# Patient Record
Sex: MALE | Race: OTHER | HISPANIC OR LATINO | Employment: OTHER | URBAN - NONMETROPOLITAN AREA
[De-identification: names, ages, dates, MRNs, and addresses within clinical notes are randomized per-mention and may not be internally consistent; named-entity substitution may affect disease eponyms.]

---

## 2022-12-02 ENCOUNTER — HOSPITAL ENCOUNTER (EMERGENCY)
Facility: HOSPITAL | Age: 57
Discharge: HOME | End: 2022-12-02
Attending: EMERGENCY MEDICINE

## 2022-12-02 VITALS
RESPIRATION RATE: 16 BRPM | TEMPERATURE: 97.8 F | SYSTOLIC BLOOD PRESSURE: 148 MMHG | HEART RATE: 60 BPM | DIASTOLIC BLOOD PRESSURE: 93 MMHG | OXYGEN SATURATION: 98 %

## 2022-12-02 DIAGNOSIS — Z87.19 HISTORY OF DIVERTICULOSIS: ICD-10-CM

## 2022-12-02 DIAGNOSIS — I10 HYPERTENSION: ICD-10-CM

## 2022-12-02 DIAGNOSIS — Z76.0 MEDICATION REFILL: Primary | ICD-10-CM

## 2022-12-02 RX ORDER — LISINOPRIL 10 MG/1
10 TABLET ORAL DAILY
Qty: 30 TABLET | Refills: 0 | Status: SHIPPED | OUTPATIENT
Start: 2022-12-02 | End: 2023-01-01

## 2022-12-02 NOTE — ED PROVIDER NOTES
History  Chief Complaint   Patient presents with   • Medication Refill     Patient states he took Lisinopril this morning and ran out of his prescription  Patient c/o headache      68-year-old male presents for evaluation of hypertension  Patient states that he does need medication refill as he recently moved to the area from [place]  Patient took his last lisinopril 10 mg this morning  Patient voices no complaints at this time  No headache, neck pain, chest pain, shortness of breath, nausea or vomiting, dizziness or diaphoresis  No focal neurological deficits  No recent illness  On exam, patient is very well appearing, no distress  None       No past medical history on file  No past surgical history on file  No family history on file  I have reviewed and agree with the history as documented  E-Cigarette/Vaping     E-Cigarette/Vaping Substances     Social History     Tobacco Use   • Smoking status: Never   • Smokeless tobacco: Never   Substance Use Topics   • Alcohol use: Yes     Comment: socially   • Drug use: Never       Review of Systems   Constitutional: Negative for activity change, appetite change, chills, diaphoresis, fatigue and fever  HENT: Negative for dental problem, ear pain, sore throat, trouble swallowing and voice change  Eyes: Negative for pain and visual disturbance  Respiratory: Negative for cough, chest tightness, shortness of breath and wheezing  Cardiovascular: Negative for chest pain, palpitations and leg swelling  Gastrointestinal: Negative for abdominal pain, anal bleeding, blood in stool, diarrhea, nausea, rectal pain and vomiting  Endocrine: Negative for polydipsia, polyphagia and polyuria  Genitourinary: Negative for difficulty urinating, dysuria, flank pain, frequency, hematuria and urgency  Musculoskeletal: Negative for back pain, joint swelling, myalgias, neck pain and neck stiffness  Skin: Negative for pallor, rash and wound  Neurological: Negative for dizziness, facial asymmetry, speech difficulty, weakness, light-headedness, numbness and headaches  Hematological: Negative for adenopathy  Psychiatric/Behavioral: Negative for agitation  The patient is not nervous/anxious  All other systems reviewed and are negative  Physical Exam  Physical Exam  Vitals and nursing note reviewed  Constitutional:       General: He is not in acute distress  Appearance: He is well-developed  He is not ill-appearing, toxic-appearing or diaphoretic  HENT:      Head: Normocephalic and atraumatic  Right Ear: External ear normal       Left Ear: External ear normal       Nose: Nose normal  No congestion or rhinorrhea  Mouth/Throat:      Mouth: Mucous membranes are moist       Pharynx: No oropharyngeal exudate or posterior oropharyngeal erythema  Eyes:      General: No visual field deficit or scleral icterus  Right eye: No discharge  Left eye: No discharge  Extraocular Movements: Extraocular movements intact  Conjunctiva/sclera: Conjunctivae normal       Pupils: Pupils are equal, round, and reactive to light  Neck:      Thyroid: No thyromegaly  Vascular: No JVD  Trachea: No tracheal deviation  Cardiovascular:      Rate and Rhythm: Normal rate and regular rhythm  Pulses: Normal pulses  Heart sounds: Normal heart sounds, S1 normal and S2 normal  No murmur heard  No friction rub  No gallop  Pulmonary:      Effort: Pulmonary effort is normal  No accessory muscle usage, respiratory distress or retractions  Breath sounds: Normal breath sounds  No stridor or decreased air movement  No decreased breath sounds, wheezing, rhonchi or rales  Chest:      Chest wall: No tenderness  Abdominal:      General: There is no distension  Palpations: Abdomen is soft  There is no mass  Tenderness: There is no abdominal tenderness  There is no guarding or rebound        Hernia: No hernia is present  Musculoskeletal:         General: No tenderness or deformity  Normal range of motion  Cervical back: Normal range of motion and neck supple  No rigidity  Lymphadenopathy:      Cervical: No cervical adenopathy  Skin:     General: Skin is warm and dry  Capillary Refill: Capillary refill takes less than 2 seconds  Coloration: Skin is not pale  Findings: No erythema or rash  Neurological:      General: No focal deficit present  Mental Status: He is alert and oriented to person, place, and time  GCS: GCS eye subscore is 4  GCS verbal subscore is 5  GCS motor subscore is 6  Cranial Nerves: Cranial nerves 2-12 are intact  No cranial nerve deficit, dysarthria or facial asymmetry  Sensory: Sensation is intact  No sensory deficit  Motor: Motor function is intact  No weakness or tremor  Coordination: Coordination is intact  Gait: Gait is intact  Deep Tendon Reflexes: Reflexes are normal and symmetric   Reflexes normal    Psychiatric:         Behavior: Behavior normal          Vital Signs  ED Triage Vitals [12/02/22 1535]   Temperature Pulse Respirations Blood Pressure SpO2   97 8 °F (36 6 °C) 79 16 141/86 97 %      Temp Source Heart Rate Source Patient Position - Orthostatic VS BP Location FiO2 (%)   Temporal Monitor Lying -- --      Pain Score       --           Vitals:    12/02/22 1535   BP: 141/86   Pulse: 79   Patient Position - Orthostatic VS: Lying         Visual Acuity      ED Medications  Medications - No data to display    Diagnostic Studies  Results Reviewed     None                 No orders to display              Procedures  Procedures         ED Course                                             MDM  Number of Diagnoses or Management Options  History of diverticulosis  Hypertension  Medication refill  Diagnosis management comments: 50-year-old male past medical history of hypertension presents for medication refill of his lisinopril 10 mg  Patient asymptomatic at this time  Recently moved to the area  Will provide prescription refill, ambulatory referral for family practice as well as Gastroenterology as patient does have a history of diverticulosis  Disposition  Final diagnoses:   Medication refill   Hypertension   History of diverticulosis     Time reflects when diagnosis was documented in both MDM as applicable and the Disposition within this note     Time User Action Codes Description Comment    12/2/2022  4:19 PM Kina Tremayne Add [Z76 0] Medication refill     12/2/2022  4:19 PM Kina Tremayne Add [I10] Hypertension     12/2/2022  4:20 PM Kina Tremayne Add [Z87 19] History of diverticulosis       ED Disposition     ED Disposition   Discharge    Condition   Stable    Date/Time   Fri Dec 2, 2022  4:19 PM    Comment   Emilee Mandujano discharge to home/self care                 Follow-up Information    None         Patient's Medications   Discharge Prescriptions    LISINOPRIL (ZESTRIL) 10 MG TABLET    Take 1 tablet (10 mg total) by mouth daily       Start Date: 12/2/2022 End Date: 1/1/2023       Order Dose: 10 mg       Quantity: 30 tablet    Refills: 0           PDMP Review     None          ED Provider  Electronically Signed by           Gogo Stewart DO  12/02/22 8715

## 2022-12-14 RX ORDER — ALBUTEROL SULFATE 90 UG/1
AEROSOL, METERED RESPIRATORY (INHALATION)
COMMUNITY
Start: 2022-09-08

## 2022-12-14 RX ORDER — FERROUS SULFATE 324(65)MG
324 TABLET, DELAYED RELEASE (ENTERIC COATED) ORAL
COMMUNITY
Start: 2022-09-09

## 2022-12-14 RX ORDER — PANTOPRAZOLE SODIUM 40 MG/1
40 TABLET, DELAYED RELEASE ORAL
COMMUNITY
Start: 2022-09-08

## 2022-12-14 RX ORDER — POLYETHYLENE GLYCOL 3350 17 G/17G
POWDER, FOR SOLUTION ORAL
COMMUNITY
Start: 2022-09-08

## 2022-12-14 RX ORDER — FINASTERIDE 5 MG/1
5 TABLET, FILM COATED ORAL
COMMUNITY
Start: 2022-09-08

## 2022-12-15 ENCOUNTER — OFFICE VISIT (OUTPATIENT)
Dept: GASTROENTEROLOGY | Facility: CLINIC | Age: 57
End: 2022-12-15

## 2022-12-15 VITALS
TEMPERATURE: 98.4 F | RESPIRATION RATE: 96 BRPM | HEIGHT: 67 IN | HEART RATE: 105 BPM | SYSTOLIC BLOOD PRESSURE: 169 MMHG | WEIGHT: 193.6 LBS | BODY MASS INDEX: 30.39 KG/M2 | OXYGEN SATURATION: 96 % | DIASTOLIC BLOOD PRESSURE: 102 MMHG

## 2022-12-15 DIAGNOSIS — Z87.19 HISTORY OF DIVERTICULOSIS: ICD-10-CM

## 2022-12-15 NOTE — PROGRESS NOTES
[facility] - Outpatient Consultation  Josr Linares 61 y o  male MRN: [id number]  Encounter: [id number]          ASSESSMENT AND PLAN:      1  History of diverticulosis    Patient presents today to establish care  He states that he has a history of diverticulosis with 5 episodes of diverticulitis since the year 2020  He also reports several colonoscopies with his last one in either June of this year were November 2021  He denies any perforations or abscesses in the past   Unable to find any results in chart review  Patient presents with information regarding past care however unable to upload information  We will attempt to collect past records  Given patient has no symptoms at this time and reports recent colonoscopy, will hold off on any further evaluation until able to review charts  Did discuss the possibility of surgical evaluation due to frequent episodes of diverticulitis however patient believes he may have already been evaluated and denied  Again will attempt to obtain records  - Ambulatory Referral to Gastroenterology    We will see patient back in 6 months or sooner if needed  ______________________________________________________________________    HPI:   Josr Linares is a 62-year-old male with a past medical history of GERD, diverticulosis and hypertension that presents today to establish care  He reports that he has been seen by the [facility] for many years but is transferring care to the area currently  He reports that he has had at least 5 episodes of diverticulitis since the year 2020  He also reports at least 8 colonoscopies in his lifetime  He is unsure if his last colonoscopy was in November 2021 or June of this year  He states that his last bout of diverticulitis was in June  He denies any perforations or abscesses during these episodes  He does also report a history of rectal bleeding in which he was found to have hemorrhoids    Patient reports that he recently had blood work drawn and his hemoglobin was 12 6  He states that he is feeling well and denies any current bleeding, bloody stools or melena  He denies any abdominal pain and states he has regular bowel movements  He does have a history of GERD and takes pantoprazole 40 mg  He denies any acid reflux, nausea, vomiting or dysphagia  He does report an EGD in the past         REVIEW OF SYSTEMS:    Review of Systems   Constitutional: Negative for unexpected weight change  HENT: Negative for trouble swallowing  Gastrointestinal: Negative for abdominal distention, abdominal pain, anal bleeding, blood in stool, constipation, diarrhea, nausea and vomiting  Historical Information   History reviewed  No pertinent past medical history  History reviewed  No pertinent surgical history  Social History   Social History     Substance and Sexual Activity   Alcohol Use Yes    Comment: socially     Social History     Substance and Sexual Activity   Drug Use Never     Social History     Tobacco Use   Smoking Status Never   Smokeless Tobacco Never     History reviewed  No pertinent family history  Meds/Allergies       Current Outpatient Medications:   •  albuterol (PROVENTIL HFA,VENTOLIN HFA) 90 mcg/act inhaler  •  ferrous sulfate 324 MG TBEC  •  finasteride (PROSCAR) 5 mg tablet  •  lisinopril (ZESTRIL) 10 mg tablet  •  pantoprazole (PROTONIX) 40 mg tablet  •  polyethylene glycol (GLYCOLAX) 17 GM/SCOOP powder    Allergies   Allergen Reactions   • Shellfish-Derived Products - Food Allergy Anaphylaxis           Objective     Blood pressure (!) 169/102, pulse 105, temperature 98 4 °F (36 9 °C), temperature source Tympanic, resp  rate (!) 96, height 5' 7" (1 702 m), weight 87 8 kg (193 lb 9 6 oz), SpO2 96 %  Body mass index is 30 32 kg/m²  PHYSICAL EXAM:      General Appearance:   Alert, cooperative, no distress   HEENT:   Normocephalic, atraumatic, anicteric       Neck:  Symmetrical, trachea midline Lungs:   Clear to auscultation bilaterally; no rales, rhonchi or wheezing; respirations unlabored    Heart[de-identified]   Regular rate and rhythm; no murmur, rub, or gallop  Abdomen:   Soft, non-tender, non-distended; normal bowel sounds; no masses, no organomegaly    Genitalia:   Deferred    Rectal:   Deferred    Skin:  No jaundice, rashes, or lesions             Lab Results:   No visits with results within 1 Day(s) from this visit  Latest known visit with results is:   No results found for any previous visit  Radiology Results:   No results found

## 2023-01-04 ENCOUNTER — TELEPHONE (OUTPATIENT)
Dept: GASTROENTEROLOGY | Facility: CLINIC | Age: 58
End: 2023-01-04

## 2023-01-05 ENCOUNTER — HOSPITAL ENCOUNTER (EMERGENCY)
Facility: HOSPITAL | Age: 58
Discharge: HOME | End: 2023-01-05
Attending: EMERGENCY MEDICINE

## 2023-01-05 VITALS
HEART RATE: 63 BPM | TEMPERATURE: 98.6 F | OXYGEN SATURATION: 94 % | RESPIRATION RATE: 13 BRPM | SYSTOLIC BLOOD PRESSURE: 156 MMHG | DIASTOLIC BLOOD PRESSURE: 97 MMHG

## 2023-01-05 DIAGNOSIS — Z76.0 ENCOUNTER FOR MEDICATION REFILL: ICD-10-CM

## 2023-01-05 DIAGNOSIS — I10 UNCONTROLLED HYPERTENSION: Primary | ICD-10-CM

## 2023-01-05 DIAGNOSIS — R94.31 ABNORMAL FINDING ON EKG: ICD-10-CM

## 2023-01-05 DIAGNOSIS — K21.9 GERD (GASTROESOPHAGEAL REFLUX DISEASE): ICD-10-CM

## 2023-01-05 LAB
2HR DELTA HS TROPONIN: 0 NG/L
ALBUMIN SERPL BCP-MCNC: 3.8 G/DL (ref 3.5–5)
ALP SERPL-CCNC: 106 U/L (ref 46–116)
ALT SERPL W P-5'-P-CCNC: 76 U/L (ref 12–78)
ANION GAP SERPL CALCULATED.3IONS-SCNC: 10 MMOL/L (ref 4–13)
AST SERPL W P-5'-P-CCNC: 35 U/L (ref 5–45)
BASOPHILS # BLD AUTO: 0.05 THOUSANDS/ÂΜL (ref 0–0.1)
BASOPHILS NFR BLD AUTO: 1 % (ref 0–1)
BILIRUB SERPL-MCNC: 0.3 MG/DL (ref 0.2–1)
BUN SERPL-MCNC: 13 MG/DL (ref 5–25)
CALCIUM SERPL-MCNC: 9 MG/DL (ref 8.3–10.1)
CARDIAC TROPONIN I PNL SERPL HS: 5 NG/L
CARDIAC TROPONIN I PNL SERPL HS: 5 NG/L
CHLORIDE SERPL-SCNC: 102 MMOL/L (ref 96–108)
CO2 SERPL-SCNC: 27 MMOL/L (ref 21–32)
CREAT SERPL-MCNC: 1.08 MG/DL (ref 0.6–1.3)
EOSINOPHIL # BLD AUTO: 0.15 THOUSAND/ÂΜL (ref 0–0.61)
EOSINOPHIL NFR BLD AUTO: 3 % (ref 0–6)
ERYTHROCYTE [DISTWIDTH] IN BLOOD BY AUTOMATED COUNT: 13.2 % (ref 11.6–15.1)
GFR SERPL CREATININE-BSD FRML MDRD: 75 ML/MIN/1.73SQ M
GLUCOSE SERPL-MCNC: 155 MG/DL (ref 65–140)
HCT VFR BLD AUTO: 44.4 % (ref 36.5–49.3)
HGB BLD-MCNC: 14.2 G/DL (ref 12–17)
IMM GRANULOCYTES # BLD AUTO: 0.01 THOUSAND/UL (ref 0–0.2)
IMM GRANULOCYTES NFR BLD AUTO: 0 % (ref 0–2)
LYMPHOCYTES # BLD AUTO: 1.32 THOUSANDS/ÂΜL (ref 0.6–4.47)
LYMPHOCYTES NFR BLD AUTO: 22 % (ref 14–44)
MAGNESIUM SERPL-MCNC: 2.3 MG/DL (ref 1.6–2.6)
MCH RBC QN AUTO: 26.7 PG (ref 26.8–34.3)
MCHC RBC AUTO-ENTMCNC: 32 G/DL (ref 31.4–37.4)
MCV RBC AUTO: 84 FL (ref 82–98)
MONOCYTES # BLD AUTO: 0.72 THOUSAND/ÂΜL (ref 0.17–1.22)
MONOCYTES NFR BLD AUTO: 12 % (ref 4–12)
NEUTROPHILS # BLD AUTO: 3.72 THOUSANDS/ÂΜL (ref 1.85–7.62)
NEUTS SEG NFR BLD AUTO: 62 % (ref 43–75)
NRBC BLD AUTO-RTO: 0 /100 WBCS
PLATELET # BLD AUTO: 322 THOUSANDS/UL (ref 149–390)
PMV BLD AUTO: 11.4 FL (ref 8.9–12.7)
POTASSIUM SERPL-SCNC: 3.8 MMOL/L (ref 3.5–5.3)
PROT SERPL-MCNC: 7.6 G/DL (ref 6.4–8.4)
RBC # BLD AUTO: 5.32 MILLION/UL (ref 3.88–5.62)
SODIUM SERPL-SCNC: 139 MMOL/L (ref 135–147)
TSH SERPL DL<=0.05 MIU/L-ACNC: 0.89 UIU/ML (ref 0.45–4.5)
WBC # BLD AUTO: 5.97 THOUSAND/UL (ref 4.31–10.16)

## 2023-01-05 RX ORDER — FINASTERIDE 5 MG/1
5 TABLET, FILM COATED ORAL DAILY
Qty: 30 TABLET | Refills: 0 | Status: SHIPPED | OUTPATIENT
Start: 2023-01-05

## 2023-01-05 RX ORDER — LISINOPRIL 10 MG/1
10 TABLET ORAL DAILY
Qty: 30 TABLET | Refills: 0 | Status: SHIPPED | OUTPATIENT
Start: 2023-01-05 | End: 2023-02-04

## 2023-01-05 RX ORDER — PANTOPRAZOLE SODIUM 40 MG/1
40 TABLET, DELAYED RELEASE ORAL DAILY
Qty: 30 TABLET | Refills: 0 | Status: SHIPPED | OUTPATIENT
Start: 2023-01-05

## 2023-01-05 RX ORDER — CLONIDINE HYDROCHLORIDE 0.1 MG/1
0.2 TABLET ORAL ONCE
Status: COMPLETED | OUTPATIENT
Start: 2023-01-05 | End: 2023-01-05

## 2023-01-05 RX ADMIN — CLONIDINE HYDROCHLORIDE 0.2 MG: 0.1 TABLET ORAL at 15:46

## 2023-01-05 NOTE — ED PROVIDER NOTES
History  Chief Complaint   Patient presents with   • Medication Refill     Pt states that he is here because you ran out of his lisinopril and tamulosin     61year old male with PMH HTN, GERD, diverticulosis, enlarged prostate, anemia presents ambulatory from home for evaluation of high blood pressure  Pt reports he has long standing history of high blood pressure  He is prescribed lisinopril 10 mg daily  He states he took last dose today  He is requesting refill of the same  He states he recently moved here from [place]  He tells me he's disabled and receives care from the [place]  He also notes he is out of his protonix as he has been taking this every other day  He also states he is out of the medication for his prostate  He reports he recently saw GI last month to establish care  He states he has h/o GI bleeding and issues with diverticulosis  He denies any current GI symptoms  Denies chest pain, SOB  Denies N/V/D/C, abdominal pain  Denies fever, chills  He notes he recently got over a cold  Denies dizziness, lightheadedness, headache, visual disturbance,  numbness, tingling  He states he had a mild headache earlier but this has improved  He states it's been a few months since he's had blood work  History provided by:  Patient and medical records   used: No    Medication Refill  Reason for request:  Medications ran out  Medications taken before: yes - see home medications        Prior to Admission Medications   Prescriptions Last Dose Informant Patient Reported? Taking?    albuterol (PROVENTIL HFA,VENTOLIN HFA) 90 mcg/act inhaler   Yes No   Sig: INHALE 2 PUFFS BY MOUTH EVERY 4 HOURS AS NEEDED FOR RESCUE BREATHING *CLEAN ACTUATOR WEEKLY*   ferrous sulfate 324 MG TBEC   Yes No   Si mg   finasteride (PROSCAR) 5 mg tablet   Yes No   Si mg   finasteride (PROSCAR) 5 mg tablet   No Yes   Sig: Take 1 tablet (5 mg total) by mouth daily   lisinopril (ZESTRIL) 10 mg tablet No No   Sig: Take 1 tablet (10 mg total) by mouth daily   lisinopril (ZESTRIL) 10 mg tablet   No Yes   Sig: Take 1 tablet (10 mg total) by mouth daily   pantoprazole (PROTONIX) 40 mg tablet   Yes No   Si mg   pantoprazole (PROTONIX) 40 mg tablet   No Yes   Sig: Take 1 tablet (40 mg total) by mouth daily   polyethylene glycol (GLYCOLAX) 17 GM/SCOOP powder   Yes No   Sig: MIX 1 CAPFUL (17GM) AND TAKE BY MOUTH AT BEDTIME FOR CONSTIPATION      Facility-Administered Medications: None       History reviewed  No pertinent past medical history  History reviewed  No pertinent surgical history  History reviewed  No pertinent family history  I have reviewed and agree with the history as documented  E-Cigarette/Vaping     E-Cigarette/Vaping Substances     Social History     Tobacco Use   • Smoking status: Never   • Smokeless tobacco: Never   Substance Use Topics   • Alcohol use: Yes     Comment: socially   • Drug use: Never       Review of Systems   Constitutional: Negative  Negative for chills, fatigue and fever  HENT: Negative  Negative for congestion, rhinorrhea and sore throat  Eyes: Negative  Negative for visual disturbance  Respiratory: Negative  Negative for cough, shortness of breath and wheezing  Cardiovascular: Negative  Negative for chest pain, palpitations and leg swelling  Gastrointestinal: Negative  Negative for abdominal pain, diarrhea, nausea and vomiting  Genitourinary: Negative  Negative for dysuria, flank pain, frequency and hematuria  Musculoskeletal: Negative  Negative for back pain, myalgias and neck pain  Skin: Negative  Negative for rash  Neurological: Negative  Negative for dizziness, light-headedness, numbness and headaches  Psychiatric/Behavioral: Negative  All other systems reviewed and are negative  Physical Exam  Physical Exam  Vitals and nursing note reviewed  Constitutional:       General: He is awake  He is not in acute distress  Appearance: Normal appearance  He is well-developed and overweight  He is not toxic-appearing or diaphoretic  HENT:      Head: Normocephalic and atraumatic  Right Ear: Hearing and external ear normal       Left Ear: Hearing and external ear normal       Nose: Nose normal       Mouth/Throat:      Mouth: Mucous membranes are moist       Pharynx: Oropharynx is clear  Uvula midline  Eyes:      General: Lids are normal  No scleral icterus  Extraocular Movements: Extraocular movements intact  Conjunctiva/sclera: Conjunctivae normal    Neck:      Trachea: Trachea and phonation normal    Cardiovascular:      Rate and Rhythm: Normal rate and regular rhythm  Pulses: Normal pulses  Radial pulses are 2+ on the right side and 2+ on the left side  Dorsalis pedis pulses are 2+ on the right side and 2+ on the left side  Posterior tibial pulses are 2+ on the right side and 2+ on the left side  Heart sounds: Normal heart sounds, S1 normal and S2 normal  No murmur heard  Pulmonary:      Effort: Pulmonary effort is normal  No tachypnea or respiratory distress  Breath sounds: Normal breath sounds  No wheezing, rhonchi or rales  Abdominal:      General: Bowel sounds are normal  There is no distension  Palpations: Abdomen is soft  Tenderness: There is no abdominal tenderness  There is no guarding or rebound  Musculoskeletal:      Cervical back: Normal range of motion and neck supple  Right lower leg: No edema  Left lower leg: No edema  Skin:     General: Skin is warm and dry  Capillary Refill: Capillary refill takes less than 2 seconds  Findings: No rash  Neurological:      General: No focal deficit present  Mental Status: He is alert and oriented to person, place, and time  GCS: GCS eye subscore is 4  GCS verbal subscore is 5  GCS motor subscore is 6  Cranial Nerves: Cranial nerves 2-12 are intact        Sensory: Sensation is intact  Motor: Motor function is intact  No weakness  Gait: Gait normal    Psychiatric:         Mood and Affect: Mood normal          Speech: Speech normal          Behavior: Behavior normal  Behavior is cooperative  Vital Signs  ED Triage Vitals [01/05/23 1417]   Temperature Pulse Respirations Blood Pressure SpO2   98 6 °F (37 °C) 90 20 (!) 182/103 95 %      Temp Source Heart Rate Source Patient Position - Orthostatic VS BP Location FiO2 (%)   Temporal Monitor Lying Right arm --      Pain Score       --           Vitals:    01/05/23 1417 01/05/23 1546 01/05/23 1600 01/05/23 1630   BP: (!) 182/103 151/91 170/98 156/97   Pulse: 90  73 63   Patient Position - Orthostatic VS: Lying   Lying         Visual Acuity      ED Medications  Medications   cloNIDine (CATAPRES) tablet 0 2 mg (0 2 mg Oral Given 1/5/23 1546)       Diagnostic Studies  Results Reviewed     Procedure Component Value Units Date/Time    HS Troponin I 2hr [[id number]]  (Normal) Collected: 01/05/23 1646    Lab Status: Final result Specimen: Blood from Arm, Right Updated: 01/05/23 1716     hs TnI 2hr 5 ng/L      Delta 2hr hsTnI 0 ng/L     TSH [[id number]]  (Normal) Collected: 01/05/23 1459    Lab Status: Final result Specimen: Blood from Arm, Right Updated: 01/05/23 1634     TSH 3RD GENERATON 0 888 uIU/mL     Narrative:      Patients undergoing fluorescein dye angiography may retain small amounts of fluorescein in the body for 48-72 hours post procedure  Samples containing fluorescein can produce falsely depressed TSH values  If the patient had this procedure,a specimen should be resubmitted post fluorescein clearance        Comprehensive metabolic panel [[id number]]  (Abnormal) Collected: 01/05/23 1459    Lab Status: Final result Specimen: Blood from Arm, Right Updated: 01/05/23 1556     Sodium 139 mmol/L      Potassium 3 8 mmol/L      Chloride 102 mmol/L      CO2 27 mmol/L      ANION GAP 10 mmol/L      BUN 13 mg/dL      Creatinine 1 08 mg/dL Glucose 155 mg/dL      Calcium 9 0 mg/dL      AST 35 U/L      ALT 76 U/L      Alkaline Phosphatase 106 U/L      Total Protein 7 6 g/dL      Albumin 3 8 g/dL      Total Bilirubin 0 30 mg/dL      eGFR 75 ml/min/1 73sq m     Narrative:      National Kidney Disease Foundation guidelines for Chronic Kidney Disease (CKD):   •  Stage 1 with normal or high GFR (GFR > 90 mL/min/1 73 square meters)  •  Stage 2 Mild CKD (GFR = 60-89 mL/min/1 73 square meters)  •  Stage 3A Moderate CKD (GFR = 45-59 mL/min/1 73 square meters)  •  Stage 3B Moderate CKD (GFR = 30-44 mL/min/1 73 square meters)  •  Stage 4 Severe CKD (GFR = 15-29 mL/min/1 73 square meters)  •  Stage 5 End Stage CKD (GFR <15 mL/min/1 73 square meters)  Note: GFR calculation is accurate only with a steady state creatinine    Magnesium [[id number]]  (Normal) Collected: 01/05/23 1459    Lab Status: Final result Specimen: Blood from Arm, Right Updated: 01/05/23 1556     Magnesium 2 3 mg/dL     HS Troponin 0hr (reflex protocol) [[id number]]  (Normal) Collected: 01/05/23 1459    Lab Status: Final result Specimen: Blood from Arm, Right Updated: 01/05/23 1546     hs TnI 0hr 5 ng/L     CBC and differential [[id number]]  (Abnormal) Collected: 01/05/23 1459    Lab Status: Final result Specimen: Blood from Arm, Right Updated: 01/05/23 1517     WBC 5 97 Thousand/uL      RBC 5 32 Million/uL      Hemoglobin 14 2 g/dL      Hematocrit 44 4 %      MCV 84 fL      MCH 26 7 pg      MCHC 32 0 g/dL      RDW 13 2 %      MPV 11 4 fL      Platelets 740 Thousands/uL      nRBC 0 /100 WBCs      Neutrophils Relative 62 %      Immat GRANS % 0 %      Lymphocytes Relative 22 %      Monocytes Relative 12 %      Eosinophils Relative 3 %      Basophils Relative 1 %      Neutrophils Absolute 3 72 Thousands/µL      Immature Grans Absolute 0 01 Thousand/uL      Lymphocytes Absolute 1 32 Thousands/µL      Monocytes Absolute 0 72 Thousand/µL      Eosinophils Absolute 0 15 Thousand/µL      Basophils Absolute 0 05 Thousands/µL                  No orders to display              Procedures  ECG 12 Lead Documentation Only    Date/Time: 1/5/2023 2:10 PM  Performed by: Zeinab Mejia PA-C  Authorized by: Zeinab Mejia PA-C     Indications / Diagnosis:  HTN  ECG reviewed by me, the ED Provider: yes    Patient location:  ED  Previous ECG:     Previous ECG:  Unavailable    Comparison to cardiac monitor: Yes    Interpretation:     Interpretation: abnormal    Rate:     ECG rate:  83    ECG rate assessment: normal    Rhythm:     Rhythm: sinus rhythm    Ectopy:     Ectopy: none    QRS:     QRS axis:  Normal    QRS intervals:  Normal  Conduction:     Conduction: normal    ST segments:     ST segments:  Normal  T waves:     T waves: non-specific and inverted      Inverted:  II, III, aVF, V4, V5 and V6  Comments:      , QRS 88, QT/QTc 356/418; no prior EKG for comparison  ED Course  ED Course as of 01/05/23 2242   Thu Jan 05, 2023   1525 WBC: 5 97   1525 Hemoglobin: 14 2   1525 Platelet Count: 677   1601 Glucose, Random(!): 155   1601 Creatinine: 1 08   1601 BUN: 13   1601 Sodium: 139   1601 Potassium: 3 8   1601 Chloride: 102   1601 CO2: 27   1601 Anion Gap: 10   1601 Calcium: 9 0   1601 AST: 35   1601 ALT: 76   1601 Alkaline Phosphatase: 106   1601 Total Protein: 7 6   1601 Albumin: 3 8   1601 TOTAL BILIRUBIN: 0 30   1601 eGFR: 75   1601 Magnesium: 2 3   1601 hs TnI 0hr: 5  Not diagnostic of ACS but will require delta troponin  1639 TSH 3RD GENERATON: 0 888   1718 Delta 2hr hsTnI: 0   1718 Pt updated on results  He is resting comfortably, offers no specific complaints  BP improved  Pt presented with uncontrolled hypertension, out of home meds since today  Pt seeking medication refills  Pt's high blood pressure did improve with treatment  Does not have current PCP - referral placed to family practice  Pt asymptomatic in regards to his hypertension    Given abnormal EKG, referral placed to cardiology  Serial troponins negative and not c/w ACS  Refills on his BPH as well as GERD medication also refilled  Strict return precautions outlined  Advised outpatient follow up with PCP and cardiology or return to ER for change in condition as outlined  Pt verbalized understanding and had no further questions  HEART Risk Score    Flowsheet Row Most Recent Value   Heart Score Risk Calculator    History 0 Filed at: 01/05/2023 1444   ECG 1 Filed at: 01/05/2023 1444   Age 1 Filed at: 01/05/2023 1444   Risk Factors 1 Filed at: 01/05/2023 1444   Troponin 0 Filed at: 01/05/2023 1444   HEART Score 3 Filed at: 01/05/2023 1444                                      Medical Decision Making  Abnormal finding on EKG: acute illness or injury  Encounter for medication refill: self-limited or minor problem  GERD (gastroesophageal reflux disease): chronic illness or injury  Uncontrolled hypertension: chronic illness or injury  Amount and/or Complexity of Data Reviewed  Labs: ordered  Decision-making details documented in ED Course  ECG/medicine tests: ordered and independent interpretation performed  Decision-making details documented in ED Course  Risk  OTC drugs  Prescription drug management  Diagnosis or treatment significantly limited by social determinants of health            Disposition  Final diagnoses:   Uncontrolled hypertension   Abnormal finding on EKG   Encounter for medication refill   GERD (gastroesophageal reflux disease)     Time reflects when diagnosis was documented in both MDM as applicable and the Disposition within this note     Time User Action Codes Description Comment    1/5/2023  5:27 PM Letyene Arnieard Add [I10] Uncontrolled hypertension     1/5/2023  5:27 PM Ellene Arnieard Add [R94 31] Abnormal finding on EKG     1/5/2023  5:27 PM Ellene Bldanialard Add [I10] Hypertension     1/5/2023  5:28 PM Letyene Arnieard Remove [I10] Hypertension     1/5/2023  5:30 PM Nancy Vásquez [Z76 0] Encounter for medication refill     1/5/2023  5:30 PM Amish Fajardo [K21 9] GERD (gastroesophageal reflux disease)       ED Disposition     ED Disposition   Discharge    Condition   Stable    Date/Time   Thu Jan 5, 2023  5:27 PM    Comment   Chrissy Louise discharge to home/self care                 Follow-up Information     Follow up With Specialties Details Why Contact Info Additional Information    Maria E Guerra MD Family Medicine, Emergency Medicine Schedule an appointment as soon as possible for a visit   [address] [place] [address] [address] [facility] Schedule an appointment as soon as possible for a visit   [address] [facility] [phone] [address] [facility] [place], [place],   [address] Emergency Department Emergency Medicine  As needed [place] [phone]  [facility] Emergency Department, [address], [address]          Discharge Medication List as of 1/5/2023  5:30 PM      CONTINUE these medications which have CHANGED    Details   finasteride (PROSCAR) 5 mg tablet Take 1 tablet (5 mg total) by mouth daily, Starting Thu 1/5/2023, Normal      lisinopril (ZESTRIL) 10 mg tablet Take 1 tablet (10 mg total) by mouth daily, Starting u 1/5/2023, Until Sat 2/4/2023, Normal      pantoprazole (PROTONIX) 40 mg tablet Take 1 tablet (40 mg total) by mouth daily, Starting u 1/5/2023, Normal         CONTINUE these medications which have NOT CHANGED    Details   albuterol (PROVENTIL HFA,VENTOLIN HFA) 90 mcg/act inhaler INHALE 2 PUFFS BY MOUTH EVERY 4 HOURS AS NEEDED FOR RESCUE BREATHING *CLEAN ACTUATOR WEEKLY*, Historical Med      ferrous sulfate 324 MG TBEC 324 mg, Starting Fri 9/9/2022, Historical Med      polyethylene glycol (GLYCOLAX) 17 GM/SCOOP powder MIX 1 CAPFUL (17GM) AND TAKE BY MOUTH AT BEDTIME FOR CONSTIPATION, Historical Med                 PDMP Review     None          ED Provider  Electronically Signed by           Eran Ulrich PA-C  01/05/23 9742

## 2023-01-05 NOTE — DISCHARGE INSTRUCTIONS
Continue home medications as prescribed  Continue to monitor your blood pressure  I have placed a referral for you to see cardiology in follow up  I have also placed a referral for you to establish care with family practice  Follow up with PCP or return to ER as needed

## 2023-01-06 LAB
ATRIAL RATE: 83 BPM
P AXIS: 56 DEGREES
PR INTERVAL: 154 MS
QRS AXIS: 82 DEGREES
QRSD INTERVAL: 88 MS
QT INTERVAL: 356 MS
QTC INTERVAL: 418 MS
T WAVE AXIS: -37 DEGREES
VENTRICULAR RATE: 83 BPM

## 2023-01-09 ENCOUNTER — TELEPHONE (OUTPATIENT)
Dept: GASTROENTEROLOGY | Facility: CLINIC | Age: 58
End: 2023-01-09

## 2023-01-09 NOTE — TELEPHONE ENCOUNTER
I called patient to let him know I will call him tomorrow to let him know if  we can upload them in the office, because  Shea could not get them to upload, patient expressed understanding

## 2023-01-09 NOTE — TELEPHONE ENCOUNTER
----- Message from Magy Rae sent at 1/5/2023  7:04 AM EST -----  Mckenna is the patient with disks from the [address] [facility], do you have, if so please give to Dominick Beltran or update patient   Thanks     ----- Message -----  From: Hazel Ruiz MA  Sent: 1/4/2023   2:25 PM EST  To: Basiliosavannah Kyra    Patient came in to drop off disks last week, he stated he gave to one of I  I do not know anything regarding this  He would like to pick them up  Do you have an idea about this? He states it was 3 disks

## 2023-01-10 NOTE — TELEPHONE ENCOUNTER
I called the patient he is going to come into the office to  pick his CD up and he is going to sign a medical release for us to receive his records  Patient expressed understanding

## 2023-02-06 ENCOUNTER — HOSPITAL ENCOUNTER (EMERGENCY)
Facility: HOSPITAL | Age: 58
Discharge: HOME | End: 2023-02-06
Attending: EMERGENCY MEDICINE

## 2023-02-06 VITALS
TEMPERATURE: 97.1 F | OXYGEN SATURATION: 93 % | SYSTOLIC BLOOD PRESSURE: 174 MMHG | DIASTOLIC BLOOD PRESSURE: 107 MMHG | HEART RATE: 81 BPM | RESPIRATION RATE: 18 BRPM

## 2023-02-06 DIAGNOSIS — I10 UNCONTROLLED HYPERTENSION: ICD-10-CM

## 2023-02-06 DIAGNOSIS — Z76.0 ENCOUNTER FOR MEDICATION REFILL: Primary | ICD-10-CM

## 2023-02-06 DIAGNOSIS — K21.9 GERD (GASTROESOPHAGEAL REFLUX DISEASE): ICD-10-CM

## 2023-02-06 RX ORDER — PANTOPRAZOLE SODIUM 40 MG/1
40 TABLET, DELAYED RELEASE ORAL DAILY
Qty: 30 TABLET | Refills: 0 | Status: SHIPPED | OUTPATIENT
Start: 2023-02-06

## 2023-02-06 RX ORDER — LISINOPRIL 10 MG/1
10 TABLET ORAL DAILY
Qty: 30 TABLET | Refills: 0 | Status: SHIPPED | OUTPATIENT
Start: 2023-02-06 | End: 2023-03-08

## 2023-02-06 RX ORDER — LISINOPRIL 5 MG/1
10 TABLET ORAL ONCE
Status: COMPLETED | OUTPATIENT
Start: 2023-02-06 | End: 2023-02-06

## 2023-02-06 RX ADMIN — LISINOPRIL 10 MG: 5 TABLET ORAL at 13:02

## 2023-02-06 NOTE — ED PROVIDER NOTES
History  Chief Complaint   Patient presents with   • Medication Refill     Pt returns for another medication refill for lisinopril 10mg and Protonix 40mg because he ran out and does not have a PCP appointment until 12     61year old male with PMH HTN, GERD, diverticulosis, enlarged prostate, anemia presents ambulatory from home requesting refill on his home medications  Pt reports he has long standing history of high blood pressure  He is prescribed lisinopril 10 mg daily  He states he took last dose the other day and ran out  He is requesting refill of the same  Denies chest pain, SOB, dizziness, lightheadedness  Denies chest pain, SOB  Denies N/V/D/C, abdominal pain  Denies fever, chills  Denies headache, visual disturbance,  numbness, tingling  He was seen here last month when he moved to the area from [place]  He gets care through the [facility]  He states he is scheduled to see family practice and cardiology at beginning of March  Requesting refills on his lisinopril and protonix until he can get to that appt  He reports his acid reflux has been well controlled  He has an appt scheduled to see our GI in follow up for ongoing care  He had EKG and lab work during his ER visit last month  He states he has been doing well on his meds when he takes them  History provided by:  Patient and medical records   used: No    Medication Refill  Medications/supplies requested:  Lisinopril 10 mg daily, protonix 40 mg daily  Reason for request:  Medications ran out  Medications taken before: yes - see home medications    Patient has complete original prescription information: yes        Prior to Admission Medications   Prescriptions Last Dose Informant Patient Reported? Taking?    albuterol (PROVENTIL HFA,VENTOLIN HFA) 90 mcg/act inhaler   Yes No   Sig: INHALE 2 PUFFS BY MOUTH EVERY 4 HOURS AS NEEDED FOR RESCUE BREATHING *CLEAN ACTUATOR WEEKLY*   ferrous sulfate 324 MG TBEC   Yes No   Si mg finasteride (PROSCAR) 5 mg tablet   No No   Sig: Take 1 tablet (5 mg total) by mouth daily   lisinopril (ZESTRIL) 10 mg tablet   No No   Sig: Take 1 tablet (10 mg total) by mouth daily   lisinopril (ZESTRIL) 10 mg tablet   No Yes   Sig: Take 1 tablet (10 mg total) by mouth daily   pantoprazole (PROTONIX) 40 mg tablet   No No   Sig: Take 1 tablet (40 mg total) by mouth daily   pantoprazole (PROTONIX) 40 mg tablet   No Yes   Sig: Take 1 tablet (40 mg total) by mouth daily   polyethylene glycol (GLYCOLAX) 17 GM/SCOOP powder   Yes No   Sig: MIX 1 CAPFUL (17GM) AND TAKE BY MOUTH AT BEDTIME FOR CONSTIPATION      Facility-Administered Medications: None       History reviewed  No pertinent past medical history  History reviewed  No pertinent surgical history  History reviewed  No pertinent family history  I have reviewed and agree with the history as documented  E-Cigarette/Vaping     E-Cigarette/Vaping Substances     Social History     Tobacco Use   • Smoking status: Never   • Smokeless tobacco: Never   Substance Use Topics   • Alcohol use: Yes     Comment: socially   • Drug use: Never       Review of Systems   Constitutional: Negative  Negative for chills, fatigue and fever  HENT: Negative  Negative for congestion, rhinorrhea and sore throat  Eyes: Negative  Negative for visual disturbance  Respiratory: Negative  Negative for cough, shortness of breath and wheezing  Cardiovascular: Negative  Negative for chest pain, palpitations and leg swelling  Gastrointestinal: Negative  Negative for abdominal pain, diarrhea, nausea and vomiting  Genitourinary: Negative  Negative for dysuria, flank pain, frequency and hematuria  Musculoskeletal: Negative  Negative for back pain and neck pain  Skin: Negative  Negative for rash  Neurological: Negative  Negative for dizziness, light-headedness, numbness and headaches  Psychiatric/Behavioral: Negative      All other systems reviewed and are negative  Physical Exam  Physical Exam  Vitals and nursing note reviewed  Constitutional:       General: He is awake  He is not in acute distress  Appearance: Normal appearance  He is well-developed  He is not toxic-appearing or diaphoretic  HENT:      Head: Normocephalic and atraumatic  Right Ear: Hearing and external ear normal       Left Ear: Hearing and external ear normal       Nose: Nose normal       Mouth/Throat:      Mouth: Mucous membranes are moist       Pharynx: Oropharynx is clear  Uvula midline  Eyes:      General: Lids are normal  No scleral icterus  Conjunctiva/sclera: Conjunctivae normal       Pupils: Pupils are equal, round, and reactive to light  Neck:      Trachea: Trachea and phonation normal    Cardiovascular:      Rate and Rhythm: Normal rate and regular rhythm  Pulses: Normal pulses  Heart sounds: Normal heart sounds, S1 normal and S2 normal  No murmur heard  Pulmonary:      Effort: Pulmonary effort is normal  No tachypnea or respiratory distress  Breath sounds: Normal breath sounds  No wheezing, rhonchi or rales  Musculoskeletal:         General: No tenderness  Normal range of motion  Cervical back: Normal range of motion and neck supple  Right lower leg: No edema  Left lower leg: No edema  Skin:     General: Skin is warm and dry  Capillary Refill: Capillary refill takes less than 2 seconds  Findings: No rash  Neurological:      General: No focal deficit present  Mental Status: He is alert and oriented to person, place, and time  GCS: GCS eye subscore is 4  GCS verbal subscore is 5  GCS motor subscore is 6  Gait: Gait normal    Psychiatric:         Mood and Affect: Mood normal          Speech: Speech normal          Behavior: Behavior normal  Behavior is cooperative           Vital Signs  ED Triage Vitals [02/06/23 1125]   Temperature Pulse Respirations Blood Pressure SpO2   (!) 97 1 °F (36 2 °C) 89 16 (!) 187/112 99 %      Temp Source Heart Rate Source Patient Position - Orthostatic VS BP Location FiO2 (%)   Temporal Monitor Sitting Right arm --      Pain Score       --           Vitals:    02/06/23 1125 02/06/23 1230 02/06/23 1302   BP: (!) 187/112 154/98 (!) 174/107   Pulse: 89 81    Patient Position - Orthostatic VS: Sitting Lying          Visual Acuity      ED Medications  Medications   lisinopril (ZESTRIL) tablet 10 mg (10 mg Oral Given 2/6/23 1302)       Diagnostic Studies  Results Reviewed     None                 No orders to display              Procedures  Procedures         ED Course  ED Course as of 02/06/23 1551   Mon Feb 06, 2023   1222 Reviewed records, pt seen here on 1/5/23 for uncontrolled hypertension  Pt notes due to insurance issues has appts scheduled with PCP as well as cardiology in the beginning of March  1226 /98 on repeat     Prior records reviewed  Pt feels well and offers no complaints  Given recent work up last month, will defer repeat labs at this time  Unfortunately due to moving to the area and insurance issues, does not have follow up appointments scheduled until March  Will provide 1 month supply of his anti-hypertensive and PPI until he has that appt  Advised to continue as prescribed  Instructed to keep appointments as scheduled  Strict return precautions outlined  Advised outpatient follow up with PCP or return to ER for change in condition as outlined  Pt verbalized understanding and had no further questions  Medical Decision Making  Encounter for medication refill: self-limited or minor problem  GERD (gastroesophageal reflux disease): chronic illness or injury  Uncontrolled hypertension: chronic illness or injury     Details: pt asymptomatic; elevated today in setting of non compliance and running out of prescribed medication  Anti-hypertensive was resumed and refill on his prescription provided    Amount and/or Complexity of Data Reviewed  External Data Reviewed: labs, radiology, ECG and notes  Risk  Prescription drug management  Diagnosis or treatment significantly limited by social determinants of health  Disposition  Final diagnoses:   Encounter for medication refill   Uncontrolled hypertension   GERD (gastroesophageal reflux disease)     Time reflects when diagnosis was documented in both MDM as applicable and the Disposition within this note     Time User Action Codes Description Comment    2/6/2023 12:27 PM Radha Comer Add [Z76 0] Encounter for medication refill     2/6/2023 12:27 PM Julia Mccann [address] Uncontrolled hypertension     2/6/2023 12:27 PM Radha Hardins Add [K21 9] GERD (gastroesophageal reflux disease)       ED Disposition     ED Disposition   Discharge    Condition   Stable    Date/Time   Mon Feb 6, 2023 12:27 PM    Comment   Disha Solomon discharge to home/self care                 Follow-up Information     Follow up With Specialties Details Why Contact Info Additional Information    [facility] Emergency Department Emergency Medicine  As needed [phone]  [address] [facility] Department, [place] [address], [place], [address] [facility], [address]          Discharge Medication List as of 2/6/2023 12:27 PM      CONTINUE these medications which have CHANGED    Details   lisinopril (ZESTRIL) 10 mg tablet Take 1 tablet (10 mg total) by mouth daily, Starting Mon 2/6/2023, Until Wed 3/8/2023, Normal      pantoprazole (PROTONIX) 40 mg tablet Take 1 tablet (40 mg total) by mouth daily, Starting Mon 2/6/2023, Normal         CONTINUE these medications which have NOT CHANGED    Details   albuterol (PROVENTIL HFA,VENTOLIN HFA) 90 mcg/act inhaler INHALE 2 PUFFS BY MOUTH EVERY 4 HOURS AS NEEDED FOR RESCUE BREATHING *CLEAN ACTUATOR WEEKLY*, Historical Med      ferrous sulfate 324 MG TBEC 324 mg, Starting Fri 9/9/2022, Historical Med      finasteride (PROSCAR) 5 mg tablet Take 1 tablet (5 mg total) by mouth daily, Starting u 1/5/2023, Normal      polyethylene glycol (GLYCOLAX) 17 GM/SCOOP powder MIX 1 CAPFUL (17GM) AND TAKE BY MOUTH AT BEDTIME FOR CONSTIPATION, Historical Med             No discharge procedures on file      PDMP Review     None          ED Provider  Electronically Signed by           Rena Dinh PA-C  02/06/23 0685

## 2023-02-06 NOTE — DISCHARGE INSTRUCTIONS
Continue your home medications as prescribed  Follow up with PCP and cardiology and GI as scheduled  Return to ER as needed

## 2023-03-08 ENCOUNTER — HOSPITAL ENCOUNTER (EMERGENCY)
Facility: HOSPITAL | Age: 58
Discharge: HOME | End: 2023-03-08
Attending: EMERGENCY MEDICINE | Admitting: EMERGENCY MEDICINE

## 2023-03-08 DIAGNOSIS — I10 UNCONTROLLED HYPERTENSION: ICD-10-CM

## 2023-03-08 DIAGNOSIS — J45.909 ASTHMA: ICD-10-CM

## 2023-03-08 DIAGNOSIS — Z76.0 ENCOUNTER FOR MEDICATION REFILL: ICD-10-CM

## 2023-03-08 DIAGNOSIS — Z76.0 MEDICATION REFILL: Primary | ICD-10-CM

## 2023-03-08 RX ORDER — LISINOPRIL 10 MG/1
10 TABLET ORAL DAILY
Qty: 30 TABLET | Refills: 1 | Status: SHIPPED | OUTPATIENT
Start: 2023-03-08 | End: 2023-04-07

## 2023-03-08 RX ORDER — ALBUTEROL SULFATE 90 UG/1
AEROSOL, METERED RESPIRATORY (INHALATION)
Qty: 18 G | Refills: 1 | Status: SHIPPED | OUTPATIENT
Start: 2023-03-08

## 2023-03-08 RX ORDER — FINASTERIDE 5 MG/1
5 TABLET, FILM COATED ORAL DAILY
Qty: 30 TABLET | Refills: 1 | Status: SHIPPED | OUTPATIENT
Start: 2023-03-08

## 2023-03-08 NOTE — ED PROVIDER NOTES
History  Chief Complaint   Patient presents with   • Medication Refill     Patient states he needs a medication refill on his lisinopril  Patient states he is having an issue with insurance and does not have a ride to the  [facility]  Patient presents to the emergency department today for evaluation regarding lack of daily medications  This is a very pleasant 70-year-old male presents ambulatory via private vehicle alone  He states he has a history of hypertension, enlarged prostate, asthma  He is having issues with his insurance therefore is unable to see a primary care provider to refill his medicines  He states today was his last dose of his lisinopril but also needs refills on this as well as his albuterol inhaler for asthma attacks as needed and Proscar  He denies any acute complaints outside of this  Would be happy to refill for this man  Prior to Admission Medications   Prescriptions Last Dose Informant Patient Reported? Taking?    albuterol (PROVENTIL HFA,VENTOLIN HFA) 90 mcg/act inhaler   Yes No   Sig: INHALE 2 PUFFS BY MOUTH EVERY 4 HOURS AS NEEDED FOR RESCUE BREATHING *CLEAN ACTUATOR WEEKLY*   albuterol (PROVENTIL HFA,VENTOLIN HFA) 90 mcg/act inhaler   No Yes   Si-2 puffs every 4-6 hours as needed for asthma   ferrous sulfate 324 MG TBEC   Yes No   Si mg   finasteride (PROSCAR) 5 mg tablet   No No   Sig: Take 1 tablet (5 mg total) by mouth daily   finasteride (PROSCAR) 5 mg tablet   No Yes   Sig: Take 1 tablet (5 mg total) by mouth daily   lisinopril (ZESTRIL) 10 mg tablet   No No   Sig: Take 1 tablet (10 mg total) by mouth daily   lisinopril (ZESTRIL) 10 mg tablet   No Yes   Sig: Take 1 tablet (10 mg total) by mouth daily   pantoprazole (PROTONIX) 40 mg tablet   No No   Sig: Take 1 tablet (40 mg total) by mouth daily   polyethylene glycol (GLYCOLAX) 17 GM/SCOOP powder   Yes No   Sig: MIX 1 CAPFUL (17GM) AND TAKE BY MOUTH AT BEDTIME FOR CONSTIPATION      Facility-Administered Medications: None       History reviewed  No pertinent past medical history  History reviewed  No pertinent surgical history  History reviewed  No pertinent family history  I have reviewed and agree with the history as documented  E-Cigarette/Vaping   • E-Cigarette Use Never User      E-Cigarette/Vaping Substances     Social History     Tobacco Use   • Smoking status: Never   • Smokeless tobacco: Never   Vaping Use   • Vaping Use: Never used   Substance Use Topics   • Alcohol use: Yes     Comment: socially   • Drug use: Never       Review of Systems   Constitutional: Negative for chills and fever  HENT: Negative for ear pain and sore throat  Eyes: Negative for pain and visual disturbance  Respiratory: Negative for cough and shortness of breath  Cardiovascular: Negative for chest pain and palpitations  Gastrointestinal: Negative for abdominal pain and vomiting  Genitourinary: Negative for dysuria and hematuria  Musculoskeletal: Negative for arthralgias and back pain  Skin: Negative for color change and rash  Neurological: Negative for seizures and syncope  All other systems reviewed and are negative  Physical Exam  Physical Exam  Vitals reviewed  Constitutional:       General: He is not in acute distress  Appearance: Normal appearance  He is not ill-appearing, toxic-appearing or diaphoretic  HENT:      Head: Normocephalic and atraumatic  Right Ear: External ear normal       Left Ear: External ear normal    Eyes:      General: No scleral icterus  Right eye: No discharge  Left eye: No discharge  Extraocular Movements: Extraocular movements intact  Conjunctiva/sclera: Conjunctivae normal    Cardiovascular:      Rate and Rhythm: Normal rate  Pulses: Normal pulses  Heart sounds: No murmur heard  No friction rub  No gallop  Pulmonary:      Effort: Pulmonary effort is normal  No respiratory distress  Breath sounds: No stridor   No wheezing, rhonchi or rales  Musculoskeletal:         General: No deformity or signs of injury  Cervical back: Normal range of motion  No rigidity  Skin:     General: Skin is warm  Coloration: Skin is not jaundiced  Findings: No lesion or rash  Neurological:      General: No focal deficit present  Mental Status: He is alert and oriented to person, place, and time  Mental status is at baseline  Gait: Gait normal    Psychiatric:         Mood and Affect: Mood normal          Thought Content: Thought content normal          Judgment: Judgment normal          Vital Signs  ED Triage Vitals   Temp Pulse Resp BP SpO2   -- -- -- -- --      Temp src Heart Rate Source Patient Position - Orthostatic VS BP Location FiO2 (%)   -- -- -- -- --      Pain Score       --           There were no vitals filed for this visit  Visual Acuity      ED Medications  Medications - No data to display    Diagnostic Studies  Results Reviewed     None                 No orders to display              Procedures  Procedures         ED Course                                             Medical Decision Making  69-year-old male here for medication refill  He is having insurance issues therefore cannot see primary care  Needs refill on his antihypertensives, metered-dose inhalers for his asthma as well as prostate medications  Without acute complaints  Will refill to help prevent life-threatening asthma issues as well as hypertensive crisis  Risk  Prescription drug management            Disposition  Final diagnoses:   Medication refill   Encounter for medication refill   Uncontrolled hypertension   Asthma     Time reflects when diagnosis was documented in both MDM as applicable and the Disposition within this note     Time User Action Codes Description Comment    3/8/2023  1:34 PM Avila PALMER Add [Z76 0] Medication refill     3/8/2023  1:35 PM Avila PALMER Add [Z76 0] Encounter for medication refill 3/8/2023  1:35 PM Magdalene Amaya ESTELA Add [I10] Uncontrolled hypertension     3/8/2023  1:37 PM Jacqui Coelho Add [V11 701] Asthma       ED Disposition     ED Disposition   Discharge    Condition   Stable    Date/Time   Wed Mar 8, 2023  1:34 PM    Comment   Scottieksenia Morris discharge to home/self care  Follow-up Information    None         Current Discharge Medication List      CONTINUE these medications which have CHANGED    Details   albuterol (PROVENTIL HFA,VENTOLIN HFA) 90 mcg/act inhaler 1-2 puffs every 4-6 hours as needed for asthma  Qty: 18 g, Refills: 1    Comments: Substitution to a formulary equivalent within the same pharmaceutical class is authorized  Associated Diagnoses: Asthma      finasteride (PROSCAR) 5 mg tablet Take 1 tablet (5 mg total) by mouth daily  Qty: 30 tablet, Refills: 1    Associated Diagnoses: Encounter for medication refill      lisinopril (ZESTRIL) 10 mg tablet Take 1 tablet (10 mg total) by mouth daily  Qty: 30 tablet, Refills: 1    Associated Diagnoses: Uncontrolled hypertension         CONTINUE these medications which have NOT CHANGED    Details   ferrous sulfate 324 MG TBEC 324 mg      pantoprazole (PROTONIX) 40 mg tablet Take 1 tablet (40 mg total) by mouth daily  Qty: 30 tablet, Refills: 0    Associated Diagnoses: GERD (gastroesophageal reflux disease)      polyethylene glycol (GLYCOLAX) 17 GM/SCOOP powder MIX 1 CAPFUL (17GM) AND TAKE BY MOUTH AT BEDTIME FOR CONSTIPATION             No discharge procedures on file      PDMP Review     None          ED Provider  Electronically Signed by           Judy Pressley PA-C  03/08/23 0236

## 2023-05-08 ENCOUNTER — HOSPITAL ENCOUNTER (EMERGENCY)
Facility: HOSPITAL | Age: 58
Discharge: HOME | End: 2023-05-08
Attending: EMERGENCY MEDICINE

## 2023-05-08 VITALS
RESPIRATION RATE: 18 BRPM | TEMPERATURE: 97.7 F | SYSTOLIC BLOOD PRESSURE: 178 MMHG | DIASTOLIC BLOOD PRESSURE: 107 MMHG | OXYGEN SATURATION: 96 % | WEIGHT: 193.56 LBS | BODY MASS INDEX: 30.32 KG/M2 | HEART RATE: 100 BPM

## 2023-05-08 DIAGNOSIS — Z76.0 ENCOUNTER FOR MEDICATION REFILL: ICD-10-CM

## 2023-05-08 DIAGNOSIS — I10 HYPERTENSION: Primary | ICD-10-CM

## 2023-05-08 DIAGNOSIS — I10 UNCONTROLLED HYPERTENSION: ICD-10-CM

## 2023-05-08 RX ORDER — FINASTERIDE 5 MG/1
5 TABLET, FILM COATED ORAL DAILY
Qty: 30 TABLET | Refills: 1 | Status: SHIPPED | OUTPATIENT
Start: 2023-05-08

## 2023-05-08 RX ORDER — LISINOPRIL 10 MG/1
10 TABLET ORAL DAILY
Qty: 30 TABLET | Refills: 1 | Status: SHIPPED | OUTPATIENT
Start: 2023-05-08 | End: 2023-07-07

## 2023-05-08 NOTE — ED PROVIDER NOTES
History  Chief Complaint   Patient presents with   • Medical Problem     Pt here for refill on lisinopril and finasteride  Also has headache     Patient presents to the emergency department today for refill on his blood pressure medication  This is a pleasant 65-year-old male who is having issues with his [employer], unable to get his daily blood pressure medications  He states he sometimes variances a headache when his blood pressure is high  Blood pressure here 178/107 otherwise without any acute symptoms requesting blood pressure medication refill  No blurred vision double vision  No head trauma  Prior to Admission Medications   Prescriptions Last Dose Informant Patient Reported? Taking? albuterol (PROVENTIL HFA,VENTOLIN HFA) 90 mcg/act inhaler   No No   Si-2 puffs every 4-6 hours as needed for asthma   ferrous sulfate 324 MG TBEC   Yes No   Si mg   finasteride (PROSCAR) 5 mg tablet   No No   Sig: Take 1 tablet (5 mg total) by mouth daily   finasteride (PROSCAR) 5 mg tablet   No Yes   Sig: Take 1 tablet (5 mg total) by mouth daily   lisinopril (ZESTRIL) 10 mg tablet   No No   Sig: Take 1 tablet (10 mg total) by mouth daily   lisinopril (ZESTRIL) 10 mg tablet   No Yes   Sig: Take 1 tablet (10 mg total) by mouth daily   pantoprazole (PROTONIX) 40 mg tablet   No No   Sig: Take 1 tablet (40 mg total) by mouth daily   polyethylene glycol (GLYCOLAX) 17 GM/SCOOP powder   Yes No   Sig: MIX 1 CAPFUL (17GM) AND TAKE BY MOUTH AT BEDTIME FOR CONSTIPATION      Facility-Administered Medications: None       Past Medical History:   Diagnosis Date   • Enlarged prostate    • Hypertension        History reviewed  No pertinent surgical history  History reviewed  No pertinent family history  I have reviewed and agree with the history as documented      E-Cigarette/Vaping   • E-Cigarette Use Never User      E-Cigarette/Vaping Substances     Social History     Tobacco Use   • Smoking status: Never   • Smokeless tobacco: Never   Vaping Use   • Vaping Use: Never used   Substance Use Topics   • Alcohol use: Yes     Comment: socially   • Drug use: Never       Review of Systems   Constitutional: Negative  Negative for activity change, appetite change, chills, diaphoresis, fatigue, fever and unexpected weight change  HENT: Negative  Negative for sore throat, trouble swallowing and voice change  Eyes: Negative  Respiratory: Negative  Negative for cough, chest tightness, shortness of breath and wheezing  Cardiovascular: Negative  Negative for chest pain, palpitations and leg swelling  Gastrointestinal: Negative  Negative for abdominal pain, blood in stool, nausea and vomiting  Endocrine: Negative  Genitourinary: Negative  Negative for flank pain and hematuria  Musculoskeletal: Negative  Negative for arthralgias, back pain, gait problem, joint swelling, myalgias, neck pain and neck stiffness  Skin: Negative  Negative for rash and wound  Allergic/Immunologic: Negative  Neurological: Positive for headaches  Negative for dizziness, seizures, syncope, weakness and light-headedness  Hematological: Negative  Psychiatric/Behavioral: Negative  All other systems reviewed and are negative  Physical Exam  Physical Exam  Vitals reviewed  Constitutional:       General: He is not in acute distress  Appearance: He is well-developed  He is not ill-appearing, toxic-appearing or diaphoretic  HENT:      Right Ear: External ear normal  No swelling  Tympanic membrane is not bulging  Left Ear: External ear normal  No swelling  Tympanic membrane is not bulging  Nose: Nose normal       Mouth/Throat:      Pharynx: No oropharyngeal exudate  Eyes:      General: Lids are normal       Conjunctiva/sclera: Conjunctivae normal       Pupils: Pupils are equal, round, and reactive to light  Neck:      Thyroid: No thyromegaly  Vascular: No JVD  Trachea: No tracheal deviation  Cardiovascular:      Rate and Rhythm: Normal rate and regular rhythm  Pulses: Normal pulses  Heart sounds: Normal heart sounds  No murmur heard  No friction rub  No gallop  Pulmonary:      Effort: Pulmonary effort is normal  No respiratory distress  Breath sounds: Normal breath sounds  No stridor  No wheezing or rales  Chest:      Chest wall: No tenderness  Abdominal:      Tenderness: Negative signs include Villegas's sign  Musculoskeletal:         General: No tenderness  Normal range of motion  Cervical back: Normal range of motion and neck supple  No edema  Normal range of motion  Lymphadenopathy:      Cervical: No cervical adenopathy  Skin:     General: Skin is warm and dry  Capillary Refill: Capillary refill takes less than 2 seconds  Coloration: Skin is not pale  Findings: No erythema or rash  Neurological:      General: No focal deficit present  Mental Status: He is alert and oriented to person, place, and time  GCS: GCS eye subscore is 4  GCS verbal subscore is 5  GCS motor subscore is 6  Cranial Nerves: No cranial nerve deficit  Sensory: No sensory deficit  Deep Tendon Reflexes: Reflexes are normal and symmetric  Psychiatric:         Mood and Affect: Mood normal          Speech: Speech normal          Behavior: Behavior normal          Thought Content:  Thought content normal          Judgment: Judgment normal          Vital Signs  ED Triage Vitals [05/08/23 1252]   Temperature Pulse Respirations Blood Pressure SpO2   97 7 °F (36 5 °C) 100 18 (!) 178/107 96 %      Temp Source Heart Rate Source Patient Position - Orthostatic VS BP Location FiO2 (%)   Temporal Monitor Sitting Left arm --      Pain Score       3           Vitals:    05/08/23 1252   BP: (!) 178/107   Pulse: 100   Patient Position - Orthostatic VS: Sitting         Visual Acuity      ED Medications  Medications - No data to display    Diagnostic Studies  Results Reviewed None                 No orders to display              Procedures  Procedures         ED Course                               SBIRT 22yo+    Flowsheet Row Most Recent Value   Initial Alcohol Screen: US AUDIT-C     1  How often do you have a drink containing alcohol? 0 Filed at: 05/08/2023 1301   2  How many drinks containing alcohol do you have on a typical day you are drinking? 0 Filed at: 05/08/2023 1301   3a  Male UNDER 65: How often do you have five or more drinks on one occasion? 0 Filed at: 05/08/2023 1301   3b  FEMALE Any Age, or MALE 65+: How often do you have 4 or more drinks on one occassion? 0 Filed at: 05/08/2023 1301   Audit-C Score 0 Filed at: 05/08/2023 1301   IRIS: How many times in the past year have you    Used an illegal drug or used a prescription medication for non-medical reasons? Never Filed at: 05/08/2023 1301                    Medical Decision Making  79-year-old male here for medication refill  Is having issues with his insurance therefore he is out of his daily blood pressure medication will provide refill  Recommend follow-up with primary care  Risk  Prescription drug management  Disposition  Final diagnoses:   Hypertension   Encounter for medication refill   Uncontrolled hypertension     Time reflects when diagnosis was documented in both MDM as applicable and the Disposition within this note     Time User Action Codes Description Comment    5/8/2023 12:58 PM Brenda Pina [place] Hypertension     5/8/2023 12:59 PM Chay PALMER Add [Z76 0] Encounter for medication refill     5/8/2023  1:00 PM Chay PALMER Add [I10] Uncontrolled hypertension       ED Disposition     ED Disposition   Discharge    Condition   Stable    Date/Time   Mon May 8, 2023 12:58 PM    Comment   Jayne Gill discharge to home/self care                 Follow-up Information    None         Current Discharge Medication List      CONTINUE these medications which have CHANGED    Details finasteride (PROSCAR) 5 mg tablet Take 1 tablet (5 mg total) by mouth daily  Qty: 30 tablet, Refills: 1    Associated Diagnoses: Encounter for medication refill      lisinopril (ZESTRIL) 10 mg tablet Take 1 tablet (10 mg total) by mouth daily  Qty: 30 tablet, Refills: 1    Associated Diagnoses: Uncontrolled hypertension         CONTINUE these medications which have NOT CHANGED    Details   albuterol (PROVENTIL HFA,VENTOLIN HFA) 90 mcg/act inhaler 1-2 puffs every 4-6 hours as needed for asthma  Qty: 18 g, Refills: 1    Comments: Substitution to a formulary equivalent within the same pharmaceutical class is authorized  Associated Diagnoses: Asthma      ferrous sulfate 324 MG TBEC 324 mg      pantoprazole (PROTONIX) 40 mg tablet Take 1 tablet (40 mg total) by mouth daily  Qty: 30 tablet, Refills: 0    Associated Diagnoses: GERD (gastroesophageal reflux disease)      polyethylene glycol (GLYCOLAX) 17 GM/SCOOP powder MIX 1 CAPFUL (17GM) AND TAKE BY MOUTH AT BEDTIME FOR CONSTIPATION             No discharge procedures on file      PDMP Review     None          ED Provider  Electronically Signed by           Lai Berrios PA-C  05/08/23 9664

## 2023-07-26 ENCOUNTER — HOSPITAL ENCOUNTER (EMERGENCY)
Facility: HOSPITAL | Age: 58
Discharge: HOME | End: 2023-07-26
Attending: EMERGENCY MEDICINE
Payer: COMMERCIAL

## 2023-07-26 VITALS
HEIGHT: 67 IN | OXYGEN SATURATION: 97 % | HEART RATE: 95 BPM | BODY MASS INDEX: 30.76 KG/M2 | SYSTOLIC BLOOD PRESSURE: 200 MMHG | TEMPERATURE: 98.2 F | DIASTOLIC BLOOD PRESSURE: 126 MMHG | RESPIRATION RATE: 18 BRPM | WEIGHT: 196 LBS

## 2023-07-26 DIAGNOSIS — I16.0 HYPERTENSIVE URGENCY: Primary | ICD-10-CM

## 2023-07-26 PROCEDURE — 99281 EMR DPT VST MAYX REQ PHY/QHP: CPT

## 2023-07-26 PROCEDURE — 99284 EMERGENCY DEPT VISIT MOD MDM: CPT | Performed by: EMERGENCY MEDICINE

## 2023-07-26 RX ORDER — LISINOPRIL 10 MG/1
10 TABLET ORAL DAILY
Qty: 90 TABLET | Refills: 0 | Status: SHIPPED | OUTPATIENT
Start: 2023-07-26

## 2023-07-26 NOTE — ED TRIAGE NOTES
Via [address] w/complaint need for medication refill; states he always gets his medications filled in ED "because I'm having a problem with getting a PCP"; looking for Lisinopril (unknown dose); states he is compliant with medication use

## 2023-07-26 NOTE — ED PROVIDER NOTES
History  Chief Complaint   Patient presents with   • Medication Refill     [address]ia [address] w/complaint need for medication refill; states he always gets his medications filled in ED "because I'm having a problem with getting a PCP"; looking for Lisinopril (unknown dose); states he is compliant with medication use     Patient is a 39-year-old male. He has a history of hypertension. He needs a lisinopril refill. He has having some issues getting into his primary MD.  He has no chest pain or trouble breathing. No peripheral edema. No severe headache or focal motor or sensory complaints. No speech or visual complaints. No abdominal or back pain. No changes in urinary output. Prior to Admission Medications   Prescriptions Last Dose Informant Patient Reported? Taking? albuterol (PROVENTIL HFA,VENTOLIN HFA) 90 mcg/act inhaler   No No   Si-2 puffs every 4-6 hours as needed for asthma   ferrous sulfate 324 MG TBEC   Yes No   Si mg   finasteride (PROSCAR) 5 mg tablet   No No   Sig: Take 1 tablet (5 mg total) by mouth daily   lisinopril (ZESTRIL) 10 mg tablet   No No   Sig: Take 1 tablet (10 mg total) by mouth daily   pantoprazole (PROTONIX) 40 mg tablet   No No   Sig: Take 1 tablet (40 mg total) by mouth daily   polyethylene glycol (GLYCOLAX) 17 GM/SCOOP powder   Yes No   Sig: MIX 1 CAPFUL (17GM) AND TAKE BY MOUTH AT BEDTIME FOR CONSTIPATION      Facility-Administered Medications: None       Past Medical History:   Diagnosis Date   • Enlarged prostate    • Hypertension        History reviewed. No pertinent surgical history. History reviewed. No pertinent family history. I have reviewed and agree with the history as documented.     E-Cigarette/Vaping   • E-Cigarette Use Never User      E-Cigarette/Vaping Substances   • Nicotine No    • THC No    • CBD No    • Flavoring No    • Other No    • Unknown No      Social History     Tobacco Use   • Smoking status: Never   • Smokeless tobacco: Never   Vaping Use • Vaping Use: Never used   Substance Use Topics   • Alcohol use: Yes     Comment: socially   • Drug use: Never       Review of Systems   Constitutional: Negative for chills and fever. HENT: Negative for rhinorrhea and sore throat. Eyes: Negative for pain, redness and visual disturbance. Respiratory: Negative for cough and shortness of breath. Cardiovascular: Negative for chest pain and leg swelling. Gastrointestinal: Negative for abdominal pain, diarrhea and vomiting. Endocrine: Negative for polydipsia and polyuria. Genitourinary: Negative for dysuria, frequency and hematuria. Musculoskeletal: Negative for back pain and neck pain. Skin: Negative for rash and wound. Allergic/Immunologic: Negative for immunocompromised state. Neurological: Negative for weakness, numbness and headaches. Psychiatric/Behavioral: Negative for hallucinations and suicidal ideas. All other systems reviewed and are negative. Physical Exam  Physical Exam  Vitals reviewed. Constitutional:       General: He is not in acute distress. Appearance: He is obese. He is not toxic-appearing. HENT:      Head: Normocephalic and atraumatic. Nose: Nose normal.      Mouth/Throat:      Mouth: Mucous membranes are moist.   Eyes:      General:         Right eye: No discharge. Left eye: No discharge. Conjunctiva/sclera: Conjunctivae normal.   Cardiovascular:      Rate and Rhythm: Normal rate and regular rhythm. Pulses: Normal pulses. Heart sounds: Normal heart sounds. No murmur heard. No friction rub. No gallop. Pulmonary:      Effort: Pulmonary effort is normal. No respiratory distress. Breath sounds: Normal breath sounds. No stridor. No wheezing, rhonchi or rales. Abdominal:      General: Bowel sounds are normal. There is no distension. Palpations: Abdomen is soft. Tenderness: There is no abdominal tenderness.  There is no right CVA tenderness, left CVA tenderness, guarding or rebound. Musculoskeletal:         General: No swelling, tenderness, deformity or signs of injury. Normal range of motion. Cervical back: Normal range of motion and neck supple. No rigidity. Right lower leg: No edema. Left lower leg: No edema. Comments: No calf pain or unilateral leg swelling   Skin:     General: Skin is warm and dry. Coloration: Skin is not jaundiced or pale. Findings: No bruising, erythema or rash. Neurological:      General: No focal deficit present. Mental Status: He is alert and oriented to person, place, and time. Cranial Nerves: No facial asymmetry. Sensory: No sensory deficit. Motor: Motor function is intact. Psychiatric:         Mood and Affect: Mood normal.         Behavior: Behavior normal.         Vital Signs  ED Triage Vitals [07/26/23 1700]   Temperature Pulse Respirations Blood Pressure SpO2   98.2 °F (36.8 °C) 95 18 (!) 200/126 97 %      Temp Source Heart Rate Source Patient Position - Orthostatic VS BP Location FiO2 (%)   Tympanic Monitor Sitting Right arm --      Pain Score       No Pain           Vitals:    07/26/23 1700   BP: (!) 200/126   Pulse: 95   Patient Position - Orthostatic VS: Sitting         Visual Acuity      ED Medications  Medications - No data to display    Diagnostic Studies  Results Reviewed     None                 No orders to display              Procedures  Procedures         ED Course                               SBIRT 22yo+    Flowsheet Row Most Recent Value   Initial Alcohol Screen: US AUDIT-C     1. How often do you have a drink containing alcohol? 0 Filed at: 07/26/2023 1702   2. How many drinks containing alcohol do you have on a typical day you are drinking? 0 Filed at: 07/26/2023 1702   3a. Male UNDER 65: How often do you have five or more drinks on one occasion? 0 Filed at: 07/26/2023 1702   Audit-C Score 0 Filed at: 07/26/2023 1702   IRIS: How many times in the past year have you. .. Used an illegal drug or used a prescription medication for non-medical reasons? Never Filed at: 07/26/2023 1702                    Medical Decision Making  No signs or symptoms of acute myocardial infarction, congestive heart failure, acute stroke, dissection, acute renal failure, hypertensive encephalopathy, or other malignant hypertension. Appropriate for discharge and continued outpatient management. Risk  Prescription drug management. Decision regarding hospitalization. Disposition  Final diagnoses:   Hypertensive urgency     Time reflects when diagnosis was documented in both MDM as applicable and the Disposition within this note     Time User Action Codes Description Comment    7/26/2023  5:16 PM Allison Sams Add [I16.0] Hypertensive urgency       ED Disposition     ED Disposition   Discharge    Condition   Stable    Date/Time   Wed Jul 26, 2023  5:15 PM    Comment   Yuliya Riddle discharge to home/self care. Follow-up Information     Follow up With Specialties Details Why Contact Info Additional Information    [address] [facility] In 2 days  [address] [facility] [id number] [phone] [address] [facility], [address] [facility], [address], [place], [address] [place] [address]          Patient's Medications   Discharge Prescriptions    LISINOPRIL (ZESTRIL) 10 MG TABLET    Take 1 tablet (10 mg total) by mouth daily       Start Date: 7/26/2023 End Date: --       Order Dose: 10 mg       Quantity: 90 tablet    Refills: 0       No discharge procedures on file.     PDMP Review     None          ED Provider  Electronically Signed by           Allison Sams MD  07/26/23 2679

## 2023-08-24 ENCOUNTER — HOSPITAL ENCOUNTER (EMERGENCY)
Facility: HOSPITAL | Age: 58
Discharge: HOME | End: 2023-08-24
Attending: EMERGENCY MEDICINE | Admitting: EMERGENCY MEDICINE
Payer: OTHER GOVERNMENT

## 2023-08-24 VITALS
DIASTOLIC BLOOD PRESSURE: 92 MMHG | RESPIRATION RATE: 18 BRPM | SYSTOLIC BLOOD PRESSURE: 179 MMHG | HEART RATE: 76 BPM | TEMPERATURE: 97.6 F | OXYGEN SATURATION: 97 %

## 2023-08-24 DIAGNOSIS — Z76.0 ENCOUNTER FOR MEDICATION REFILL: Primary | ICD-10-CM

## 2023-08-24 PROCEDURE — 99283 EMERGENCY DEPT VISIT LOW MDM: CPT | Performed by: EMERGENCY MEDICINE

## 2023-08-24 PROCEDURE — 99281 EMR DPT VST MAYX REQ PHY/QHP: CPT

## 2023-08-24 RX ORDER — FINASTERIDE 5 MG/1
5 TABLET, FILM COATED ORAL DAILY
Qty: 90 TABLET | Refills: 0 | Status: SHIPPED | OUTPATIENT
Start: 2023-08-24

## 2023-08-24 NOTE — ED PROVIDER NOTES
History  Chief Complaint   Patient presents with   • Medication Refill     Needs refill on finasteride 5mg. Last dose was this morning      Oliver Parikh is a pleasant and well-appearing 58-year-old male with past medical history including hypertension and BPH arriving ambulatory to the emergency department today with request for a refill on his daily finasteride 5 mg. States that his last dose was this morning. He admits that he has been experiencing issues with his insurance company which has delayed his ability to follow up with his PCP. Patient otherwise offers no complaints on my assessment. He denies any difficulty urinating or UTI symptoms, abdominal pain or back pain, fevers, chills, sweats and remainder of ROS is negative. Prior to Admission Medications   Prescriptions Last Dose Informant Patient Reported? Taking? albuterol (PROVENTIL HFA,VENTOLIN HFA) 90 mcg/act inhaler   No No   Si-2 puffs every 4-6 hours as needed for asthma   ferrous sulfate 324 MG TBEC   Yes No   Si mg   finasteride (PROSCAR) 5 mg tablet   No No   Sig: Take 1 tablet (5 mg total) by mouth daily   lisinopril (ZESTRIL) 10 mg tablet   No No   Sig: Take 1 tablet (10 mg total) by mouth daily   lisinopril (ZESTRIL) 10 mg tablet   No No   Sig: Take 1 tablet (10 mg total) by mouth daily   pantoprazole (PROTONIX) 40 mg tablet   No No   Sig: Take 1 tablet (40 mg total) by mouth daily   polyethylene glycol (GLYCOLAX) 17 GM/SCOOP powder   Yes No   Sig: MIX 1 CAPFUL (17GM) AND TAKE BY MOUTH AT BEDTIME FOR CONSTIPATION      Facility-Administered Medications: None       Past Medical History:   Diagnosis Date   • Enlarged prostate    • Hypertension        History reviewed. No pertinent surgical history. History reviewed. No pertinent family history. I have reviewed and agree with the history as documented.     E-Cigarette/Vaping   • E-Cigarette Use Never User      E-Cigarette/Vaping Substances   • Nicotine No    • THC No    • CBD No    • Flavoring No    • Other No    • Unknown No      Social History     Tobacco Use   • Smoking status: Never   • Smokeless tobacco: Never   Vaping Use   • Vaping Use: Never used   Substance Use Topics   • Alcohol use: Yes     Comment: socially   • Drug use: Never       Review of Systems   Constitutional: Negative for fever. Gastrointestinal: Negative for abdominal pain, nausea and vomiting. Genitourinary: Negative for difficulty urinating, dysuria, flank pain, frequency, hematuria and urgency. Musculoskeletal: Negative for back pain. All other systems reviewed and are negative. Physical Exam  Physical Exam  Vitals and nursing note reviewed. Constitutional:       General: He is not in acute distress. Appearance: Normal appearance. He is not ill-appearing, toxic-appearing or diaphoretic. HENT:      Head: Normocephalic and atraumatic. Right Ear: External ear normal.      Left Ear: External ear normal.   Eyes:      Conjunctiva/sclera: Conjunctivae normal.   Cardiovascular:      Rate and Rhythm: Normal rate. Pulmonary:      Effort: Pulmonary effort is normal.   Musculoskeletal:         General: Normal range of motion. Cervical back: Normal range of motion and neck supple. Skin:     General: Skin is warm and dry. Capillary Refill: Capillary refill takes less than 2 seconds. Neurological:      Mental Status: He is alert. Mental status is at baseline. Gait: Gait is intact.    Psychiatric:         Mood and Affect: Mood normal.         Vital Signs  ED Triage Vitals   Temperature Pulse Respirations Blood Pressure SpO2   08/24/23 1341 08/24/23 1341 08/24/23 1341 08/24/23 1343 08/24/23 1341   97.6 °F (36.4 °C) 76 18 (!) 179/92 97 %      Temp Source Heart Rate Source Patient Position - Orthostatic VS BP Location FiO2 (%)   08/24/23 1341 08/24/23 1341 08/24/23 1341 08/24/23 1341 --   Temporal Monitor Sitting Right arm       Pain Score       08/24/23 1341       No Pain Vitals:    08/24/23 1341 08/24/23 1343   BP:  (!) 179/92   Pulse: 76    Patient Position - Orthostatic VS: Sitting          Visual Acuity      ED Medications  Medications - No data to display    Diagnostic Studies  Results Reviewed     None                 No orders to display              Procedures  Procedures         ED Course                               SBIRT 22yo+    Flowsheet Row Most Recent Value   Initial Alcohol Screen: US AUDIT-C     1. How often do you have a drink containing alcohol? 0 Filed at: 08/24/2023 1342   2. How many drinks containing alcohol do you have on a typical day you are drinking? 0 Filed at: 08/24/2023 1342   3a. Male UNDER 65: How often do you have five or more drinks on one occasion? 0 Filed at: 08/24/2023 1342   Audit-C Score 0 Filed at: 08/24/2023 1342   IRIS: How many times in the past year have you. .. Used an illegal drug or used a prescription medication for non-medical reasons? Never Filed at: 08/24/2023 1342                    Medical Decision Making  MDM: Otherwise healthy and well-appearing 39-year-old male presenting with request for a refill of his finasteride 5 mg. Last dose was yesterday. Offers no complaints on my assessment. Refill for his medication was given. Encouraged establishing with PCP or urology for further management. Parameters for ED return discussed at length. Patient comfortable and agreeable with plan as above and was discharged in stable condition, ambulating independently from the emergency department without issue. Amount and/or Complexity of Data Reviewed  External Data Reviewed: notes. Risk  Prescription drug management.           Disposition  Final diagnoses:   Encounter for medication refill     Time reflects when diagnosis was documented in both MDM as applicable and the Disposition within this note     Time User Action Codes Description Comment    8/24/2023  1:45 PM Nellie Gonzalez Add [Z76.0] Encounter for medication refill       ED Disposition     ED Disposition   Discharge    Condition   Stable    Date/Time   Thu Aug 24, 2023  1:45 PM    Comment   Virlinda Lunch discharge to home/self care. Follow-up Information    None         Discharge Medication List as of 8/24/2023  1:56 PM      START taking these medications    Details   !! finasteride (PROSCAR) 5 mg tablet Take 1 tablet (5 mg total) by mouth daily, Starting Thu 8/24/2023, Normal       !! - Potential duplicate medications found. Please discuss with provider. CONTINUE these medications which have NOT CHANGED    Details   albuterol (PROVENTIL HFA,VENTOLIN HFA) 90 mcg/act inhaler 1-2 puffs every 4-6 hours as needed for asthma, Normal      ferrous sulfate 324 MG TBEC 324 mg, Starting Fri 9/9/2022, Historical Med      !! finasteride (PROSCAR) 5 mg tablet Take 1 tablet (5 mg total) by mouth daily, Starting Mon 5/8/2023, Normal      lisinopril (ZESTRIL) 10 mg tablet Take 1 tablet (10 mg total) by mouth daily, Starting Wed 7/26/2023, Normal      pantoprazole (PROTONIX) 40 mg tablet Take 1 tablet (40 mg total) by mouth daily, Starting Mon 2/6/2023, Normal      polyethylene glycol (GLYCOLAX) 17 GM/SCOOP powder MIX 1 CAPFUL (17GM) AND TAKE BY MOUTH AT BEDTIME FOR CONSTIPATION, Historical Med       !! - Potential duplicate medications found. Please discuss with provider. No discharge procedures on file.     PDMP Review     None          ED Provider  Electronically Signed by           Elina Gordon PA-C  08/24/23 1764

## 2023-12-18 ENCOUNTER — HOSPITAL ENCOUNTER (EMERGENCY)
Facility: HOSPITAL | Age: 58
Discharge: HOME | End: 2023-12-18
Attending: EMERGENCY MEDICINE | Admitting: EMERGENCY MEDICINE
Payer: COMMERCIAL

## 2023-12-18 VITALS
OXYGEN SATURATION: 93 % | DIASTOLIC BLOOD PRESSURE: 100 MMHG | RESPIRATION RATE: 18 BRPM | HEART RATE: 84 BPM | SYSTOLIC BLOOD PRESSURE: 167 MMHG | TEMPERATURE: 97.5 F

## 2023-12-18 DIAGNOSIS — Z76.0 ENCOUNTER FOR MEDICATION REFILL: Primary | ICD-10-CM

## 2023-12-18 DIAGNOSIS — N40.0 BPH (BENIGN PROSTATIC HYPERPLASIA): ICD-10-CM

## 2023-12-18 DIAGNOSIS — I10 UNCONTROLLED HYPERTENSION: ICD-10-CM

## 2023-12-18 PROCEDURE — 99284 EMERGENCY DEPT VISIT MOD MDM: CPT | Performed by: EMERGENCY MEDICINE

## 2023-12-18 PROCEDURE — 99281 EMR DPT VST MAYX REQ PHY/QHP: CPT

## 2023-12-18 RX ORDER — LISINOPRIL 10 MG/1
10 TABLET ORAL DAILY
Qty: 30 TABLET | Refills: 2 | Status: SHIPPED | OUTPATIENT
Start: 2023-12-18 | End: 2024-03-17

## 2023-12-18 RX ORDER — FINASTERIDE 5 MG/1
5 TABLET, FILM COATED ORAL DAILY
Qty: 30 TABLET | Refills: 2 | Status: SHIPPED | OUTPATIENT
Start: 2023-12-18

## 2023-12-18 NOTE — ED PROVIDER NOTES
History  Chief Complaint   Patient presents with    Medication Refill     Needs refill of lisinopril finasteride      This is a 58-year-old man with the noted past medical history presents to the emergency department requesting refills of lisinopril and finasteride.  [facility] [place], but he lacks transportation to get there and is unable to follow-up with the physicians there.  Separately from this, that he has ongoing insurance issues related to his state ID which are complicating his being able to get medical care at locations apart from [facility].  He anticipates being able to resolve this but is unsure of the timeframe which will occur.  He states he would like to switch over his medical care to this facility, but he does not have a local physician as of yet.  He has been taking the lisinopril and finasteride as prescribed with no gaps in treatment up to this point.  He otherwise feels well and has no specific complaints.    Finasteride and lisinopril were prescribed without issue.  Ambulatory referral to internal medicine was placed at discharge.  He can separately contact any of the internal medicine groups locally to establish follow-up care.  No changes in his medications were indicated at present, and as he is tolerating his medications well with no adverse symptoms, I see no indication for further diagnostic workup at this point.  All questions were answered to his satisfaction prior to discharge.  He expressed understanding and agreed to plan.      History provided by:  Patient and medical records  Medication Refill      Prior to Admission Medications   Prescriptions Last Dose Informant Patient Reported? Taking?   albuterol (PROVENTIL HFA,VENTOLIN HFA) 90 mcg/act inhaler   No No   Si-2 puffs every 4-6 hours as needed for asthma   ferrous sulfate 324 MG TBEC   Yes No   Si mg   finasteride (PROSCAR) 5 mg tablet   No No   Sig: Take 1 tablet (5 mg  total) by mouth daily   finasteride (PROSCAR) 5 mg tablet   No No   Sig: Take 1 tablet (5 mg total) by mouth daily   finasteride (PROSCAR) 5 mg tablet   No Yes   Sig: Take 1 tablet (5 mg total) by mouth daily   lisinopril (ZESTRIL) 10 mg tablet   No No   Sig: Take 1 tablet (10 mg total) by mouth daily   lisinopril (ZESTRIL) 10 mg tablet   No No   Sig: Take 1 tablet (10 mg total) by mouth daily   lisinopril (ZESTRIL) 10 mg tablet   No Yes   Sig: Take 1 tablet (10 mg total) by mouth daily   pantoprazole (PROTONIX) 40 mg tablet   No No   Sig: Take 1 tablet (40 mg total) by mouth daily   polyethylene glycol (GLYCOLAX) 17 GM/SCOOP powder   Yes No   Sig: MIX 1 CAPFUL (17GM) AND TAKE BY MOUTH AT BEDTIME FOR CONSTIPATION      Facility-Administered Medications: None       Past Medical History:   Diagnosis Date    Enlarged prostate     Hypertension        History reviewed. No pertinent surgical history.    History reviewed. No pertinent family history.  I have reviewed and agree with the history as documented.    E-Cigarette/Vaping    E-Cigarette Use Never User      E-Cigarette/Vaping Substances    Nicotine No     THC No     CBD No     Flavoring No     Other No     Unknown No      Social History     Tobacco Use    Smoking status: Never    Smokeless tobacco: Never   Vaping Use    Vaping status: Never Used   Substance Use Topics    Alcohol use: Yes     Comment: socially    Drug use: Never       Review of Systems   Constitutional: Negative.    Respiratory: Negative.     Cardiovascular: Negative.    Neurological: Negative.        Physical Exam  Physical Exam  Vitals and nursing note reviewed.   Constitutional:       General: He is awake. He is not in acute distress.     Appearance: Normal appearance. He is well-developed and well-groomed.   HENT:      Head: Normocephalic and atraumatic.      Right Ear: Hearing and external ear normal.      Left Ear: Hearing and external ear normal.   Neck:      Trachea: Trachea and phonation  normal.   Cardiovascular:      Rate and Rhythm: Normal rate and regular rhythm.      Pulses: Normal pulses.           Radial pulses are 2+ on the right side and 2+ on the left side.   Pulmonary:      Effort: Pulmonary effort is normal. No tachypnea, accessory muscle usage or respiratory distress.   Skin:     General: Skin is warm and dry.   Neurological:      Mental Status: He is alert and oriented to person, place, and time.      GCS: GCS eye subscore is 4. GCS verbal subscore is 5. GCS motor subscore is 6.         Vital Signs  ED Triage Vitals [12/18/23 1152]   Temperature Pulse Respirations Blood Pressure SpO2   97.5 °F (36.4 °C) 84 18 167/100 93 %      Temp Source Heart Rate Source Patient Position - Orthostatic VS BP Location FiO2 (%)   Temporal Monitor Sitting Right arm --      Pain Score       No Pain           Vitals:    12/18/23 1152   BP: 167/100   Pulse: 84   Patient Position - Orthostatic VS: Sitting         Visual Acuity      ED Medications  Medications - No data to display    Diagnostic Studies  Results Reviewed       None                   No orders to display              Procedures  Procedures         ED Course         SBIRT 20yo+      Flowsheet Row Most Recent Value   Initial Alcohol Screen: US AUDIT-C     1. How often do you have a drink containing alcohol? 0 Filed at: 12/18/2023 1153   2. How many drinks containing alcohol do you have on a typical day you are drinking?  0 Filed at: 12/18/2023 1153   3a. Male UNDER 65: How often do you have five or more drinks on one occasion? 0 Filed at: 12/18/2023 1153   Audit-C Score 0 Filed at: 12/18/2023 1153   IRIS: How many times in the past year have you...    Used an illegal drug or used a prescription medication for non-medical reasons? Never Filed at: 12/18/2023 1153            Medical Decision Making  Risk  Prescription drug management.             Disposition  Final diagnoses:   Encounter for medication refill   BPH (benign prostatic hyperplasia)      Time reflects when diagnosis was documented in both MDM as applicable and the Disposition within this note       Time User Action Codes Description Comment    12/18/2023 12:10 PM Ritz, Warren Carl Add [Z76.0] Encounter for medication refill     12/18/2023 12:10 PM Ritz, Warren Carl Add [I10] Uncontrolled hypertension     12/18/2023 12:11 PM Ritz, Warren Carl Add [N40.0] BPH (benign prostatic hyperplasia)           ED Disposition       ED Disposition   Discharge    Condition   Stable    Date/Time   Mon Dec 18, 2023 1210    Comment   Javier Arvizu discharge to home/self care.                   Follow-up Information       Follow up With Specialties Details Why Contact Info Additional Information    [facility] [address] [id number] [phone] [facility], [address] [place] [address], [id number] [phone]            Discharge Medication List as of 12/18/2023 12:12 PM        CONTINUE these medications which have CHANGED    Details   !! finasteride (PROSCAR) 5 mg tablet Take 1 tablet (5 mg total) by mouth daily, Starting Mon 12/18/2023, Normal      !! lisinopril (ZESTRIL) 10 mg tablet Take 1 tablet (10 mg total) by mouth daily, Starting Mon 12/18/2023, Until Sun 3/17/2024, Normal       !! - Potential duplicate medications found. Please discuss with provider.        CONTINUE these medications which have NOT CHANGED    Details   albuterol (PROVENTIL HFA,VENTOLIN HFA) 90 mcg/act inhaler 1-2 puffs every 4-6 hours as needed for asthma, Normal      ferrous sulfate 324 MG TBEC 324 mg, Starting Fri 9/9/2022, Historical Med      !! finasteride (PROSCAR) 5 mg tablet Take 1 tablet (5 mg total) by mouth daily, Starting Thu 8/24/2023, Normal      !! lisinopril (ZESTRIL) 10 mg tablet Take 1 tablet (10 mg total) by mouth daily, Starting Wed 7/26/2023, Normal      pantoprazole (PROTONIX) 40 mg tablet Take 1 tablet (40  mg total) by mouth daily, Starting Mon 2/6/2023, Normal      polyethylene glycol (GLYCOLAX) 17 GM/SCOOP powder MIX 1 CAPFUL (17GM) AND TAKE BY MOUTH AT BEDTIME FOR CONSTIPATION, Historical Med       !! - Potential duplicate medications found. Please discuss with provider.              PDMP Review       None            ED Provider  Electronically Signed by             Warren Hernandez DO  12/18/23 1959

## 2023-12-18 NOTE — DISCHARGE INSTRUCTIONS
You have been prescribed lisinopril and finasteride at their current doses.      You can contact the physician listed on these discharge instructions to establish primary care until you are able to resolve the issue with your insurance coverage and the [facility].

## 2024-02-02 ENCOUNTER — HOSPITAL ENCOUNTER (EMERGENCY)
Facility: HOSPITAL | Age: 59
Discharge: HOME | End: 2024-02-02
Attending: EMERGENCY MEDICINE
Payer: COMMERCIAL

## 2024-02-02 VITALS
OXYGEN SATURATION: 94 % | SYSTOLIC BLOOD PRESSURE: 192 MMHG | DIASTOLIC BLOOD PRESSURE: 113 MMHG | RESPIRATION RATE: 15 BRPM | TEMPERATURE: 98.6 F | HEART RATE: 74 BPM

## 2024-02-02 DIAGNOSIS — I10 HYPERTENSION: ICD-10-CM

## 2024-02-02 DIAGNOSIS — Z87.09 HISTORY OF ASTHMA: ICD-10-CM

## 2024-02-02 DIAGNOSIS — I10 UNCONTROLLED HYPERTENSION: ICD-10-CM

## 2024-02-02 DIAGNOSIS — Z76.0 ENCOUNTER FOR MEDICATION REFILL: Primary | ICD-10-CM

## 2024-02-02 DIAGNOSIS — J45.909 ASTHMA: ICD-10-CM

## 2024-02-02 PROCEDURE — 99281 EMR DPT VST MAYX REQ PHY/QHP: CPT

## 2024-02-02 PROCEDURE — 99284 EMERGENCY DEPT VISIT MOD MDM: CPT | Performed by: EMERGENCY MEDICINE

## 2024-02-02 RX ORDER — LISINOPRIL 5 MG/1
10 TABLET ORAL ONCE
Status: COMPLETED | OUTPATIENT
Start: 2024-02-02 | End: 2024-02-02

## 2024-02-02 RX ORDER — FINASTERIDE 5 MG/1
5 TABLET, FILM COATED ORAL DAILY
Qty: 30 TABLET | Refills: 2 | Status: SHIPPED | OUTPATIENT
Start: 2024-02-02

## 2024-02-02 RX ORDER — LISINOPRIL 10 MG/1
10 TABLET ORAL DAILY
Qty: 30 TABLET | Refills: 2 | Status: SHIPPED | OUTPATIENT
Start: 2024-02-02 | End: 2024-05-02

## 2024-02-02 RX ORDER — ALBUTEROL SULFATE 90 UG/1
AEROSOL, METERED RESPIRATORY (INHALATION)
Qty: 18 G | Refills: 1 | Status: SHIPPED | OUTPATIENT
Start: 2024-02-02

## 2024-02-02 RX ADMIN — LISINOPRIL 10 MG: 5 TABLET ORAL at 13:45

## 2024-02-02 NOTE — ED PROVIDER NOTES
History  Chief Complaint   Patient presents with    Medication Refill     Pt states he needs a refill on lisinopril and finasteride. Last doses took yesterday morning      58-year-old male presents for location refill.  Patient reports difficulty obtaining insurance due to misinformation on a birth certificate and Social Security card.  Patient is originally from [place], states an appointment in the springtime to hopefully resolve this issue.  In the meantime he has had difficulty getting his prescriptions ordered for him.  Patient states he ran out of his lisinopril and Proscar yesterday, has not had a dose this today.  Patient is hypertensive, states he has been otherwise his usual health.  Denies headaches, chest pain, dyspnea, lower extremity swelling.        Prior to Admission Medications   Prescriptions Last Dose Informant Patient Reported? Taking?   albuterol (PROVENTIL HFA,VENTOLIN HFA) 90 mcg/act inhaler   No No   Si-2 puffs every 4-6 hours as needed for asthma   albuterol (PROVENTIL HFA,VENTOLIN HFA) 90 mcg/act inhaler   No Yes   Si-2 puffs every 4-6 hours as needed for asthma   ferrous sulfate 324 MG TBEC   Yes No   Si mg   finasteride (PROSCAR) 5 mg tablet   No No   Sig: Take 1 tablet (5 mg total) by mouth daily   finasteride (PROSCAR) 5 mg tablet   No No   Sig: Take 1 tablet (5 mg total) by mouth daily   finasteride (PROSCAR) 5 mg tablet   No Yes   Sig: Take 1 tablet (5 mg total) by mouth daily   lisinopril (ZESTRIL) 10 mg tablet   No No   Sig: Take 1 tablet (10 mg total) by mouth daily   lisinopril (ZESTRIL) 10 mg tablet   No No   Sig: Take 1 tablet (10 mg total) by mouth daily   lisinopril (ZESTRIL) 10 mg tablet   No Yes   Sig: Take 1 tablet (10 mg total) by mouth daily   pantoprazole (PROTONIX) 40 mg tablet   No No   Sig: Take 1 tablet (40 mg total) by mouth daily   polyethylene glycol (GLYCOLAX) 17 GM/SCOOP powder   Yes No   Sig: MIX 1 CAPFUL (17GM) AND TAKE BY MOUTH AT BEDTIME  FOR CONSTIPATION      Facility-Administered Medications: None       Past Medical History:   Diagnosis Date    Enlarged prostate     Hypertension        History reviewed. No pertinent surgical history.    History reviewed. No pertinent family history.  I have reviewed and agree with the history as documented.    E-Cigarette/Vaping    E-Cigarette Use Never User      E-Cigarette/Vaping Substances    Nicotine No     THC No     CBD No     Flavoring No     Other No     Unknown No      Social History     Tobacco Use    Smoking status: Never    Smokeless tobacco: Never   Vaping Use    Vaping status: Never Used   Substance Use Topics    Alcohol use: Yes     Comment: socially    Drug use: Never       Review of Systems   Constitutional:  Negative for activity change and appetite change.   Respiratory:  Negative for shortness of breath.    Cardiovascular:  Negative for chest pain and leg swelling.   Gastrointestinal:  Negative for abdominal pain.   Neurological:  Negative for headaches.   All other systems reviewed and are negative.      Physical Exam  Physical Exam  Vitals reviewed.   Constitutional:       General: He is not in acute distress.     Appearance: Normal appearance. He is not ill-appearing, toxic-appearing or diaphoretic.   HENT:      Head: Normocephalic and atraumatic.      Right Ear: External ear normal.      Left Ear: External ear normal.   Eyes:      General:         Right eye: No discharge.         Left eye: No discharge.      Extraocular Movements: Extraocular movements intact.   Cardiovascular:      Rate and Rhythm: Normal rate and regular rhythm.   Pulmonary:      Effort: Pulmonary effort is normal. No respiratory distress.   Musculoskeletal:         General: No deformity or signs of injury.   Skin:     General: Skin is warm.      Coloration: Skin is not jaundiced or pale.   Neurological:      General: No focal deficit present.      Mental Status: He is alert.         Vital Signs  ED Triage Vitals [02/02/24  1324]   Temperature Pulse Respirations Blood Pressure SpO2   98.6 °F (37 °C) 74 15 (!) 192/113 94 %      Temp Source Heart Rate Source Patient Position - Orthostatic VS BP Location FiO2 (%)   Tympanic Monitor Sitting Right arm --      Pain Score       No Pain           Vitals:    02/02/24 1324   BP: (!) 192/113   Pulse: 74   Patient Position - Orthostatic VS: Sitting         Visual Acuity      ED Medications  Medications   lisinopril (ZESTRIL) tablet 10 mg (10 mg Oral Given 2/2/24 1345)       Diagnostic Studies  Results Reviewed       None                   No orders to display              Procedures  Procedures         ED Course                               SBIRT 20yo+      Flowsheet Row Most Recent Value   Initial Alcohol Screen: US AUDIT-C     1. How often do you have a drink containing alcohol? 0 Filed at: 02/02/2024 1326   2. How many drinks containing alcohol do you have on a typical day you are drinking?  0 Filed at: 02/02/2024 1326   3a. Male UNDER 65: How often do you have five or more drinks on one occasion? 0 Filed at: 02/02/2024 1326   3b. FEMALE Any Age, or MALE 65+: How often do you have 4 or more drinks on one occassion? 0 Filed at: 02/02/2024 1326   Audit-C Score 0 Filed at: 02/02/2024 1326   IRIS: How many times in the past year have you...    Used an illegal drug or used a prescription medication for non-medical reasons? Never Filed at: 02/02/2024 1326                      Medical Decision Making  58-year-old male presents for medication refill.  Patient is hypertensive today, has not had his medications, will provide dose of lisinopril and refills.  Patient hopes to have this issue resolved in the next few months.    Risk  Prescription drug management.             Disposition  Final diagnoses:   Encounter for medication refill   Hypertension   History of asthma     Time reflects when diagnosis was documented in both MDM as applicable and the Disposition within this note       Time User Action  Codes Description Comment    2/2/2024  1:34 PM Paola Jin Add [Z76.0] Encounter for medication refill     2/2/2024  1:34 PM Paola Jin Add [I10] Hypertension     2/2/2024  1:36 PM AnnabelMadison haqgan Add [I10] Uncontrolled hypertension     2/2/2024  1:37 PM AnnabelMadison haqgan Add [J45.909] Asthma     2/2/2024  1:37 PM Madison Jingan Add [Z87.09] History of asthma           ED Disposition       ED Disposition   Discharge    Condition   Stable    Date/Time   Fri Feb 2, 2024 1334    Comment   Javier Arvizu discharge to home/self care.                   Follow-up Information       Follow up With Specialties Details Why Contact Info Additional Information    [facility] Schedule an appointment as soon as possible for a visit   [address] [place] [id number] [phone] [facility], [address] [place] [address], [id number] [phone]            Discharge Medication List as of 2/2/2024  1:40 PM        CONTINUE these medications which have CHANGED    Details   albuterol (PROVENTIL HFA,VENTOLIN HFA) 90 mcg/act inhaler 1-2 puffs every 4-6 hours as needed for asthma, Normal      !! finasteride (PROSCAR) 5 mg tablet Take 1 tablet (5 mg total) by mouth daily, Starting Fri 2/2/2024, Normal      !! lisinopril (ZESTRIL) 10 mg tablet Take 1 tablet (10 mg total) by mouth daily, Starting Fri 2/2/2024, Until Thu 5/2/2024, Normal       !! - Potential duplicate medications found. Please discuss with provider.        CONTINUE these medications which have NOT CHANGED    Details   ferrous sulfate 324 MG TBEC 324 mg, Starting Fri 9/9/2022, Historical Med      !! finasteride (PROSCAR) 5 mg tablet Take 1 tablet (5 mg total) by mouth daily, Starting Thu 8/24/2023, Normal      !! lisinopril (ZESTRIL) 10 mg tablet Take 1 tablet (10 mg total) by mouth daily, Starting Wed 7/26/2023, Normal      pantoprazole (PROTONIX) 40 mg  tablet Take 1 tablet (40 mg total) by mouth daily, Starting Mon 2/6/2023, Normal      polyethylene glycol (GLYCOLAX) 17 GM/SCOOP powder MIX 1 CAPFUL (17GM) AND TAKE BY MOUTH AT BEDTIME FOR CONSTIPATION, Historical Med       !! - Potential duplicate medications found. Please discuss with provider.          No discharge procedures on file.    PDMP Review       None            ED Provider  Electronically Signed by             Paola Jin DO  02/02/24 1623